# Patient Record
Sex: FEMALE | Race: WHITE | NOT HISPANIC OR LATINO
[De-identification: names, ages, dates, MRNs, and addresses within clinical notes are randomized per-mention and may not be internally consistent; named-entity substitution may affect disease eponyms.]

---

## 2017-01-09 ENCOUNTER — APPOINTMENT (OUTPATIENT)
Dept: ELECTROPHYSIOLOGY | Facility: CLINIC | Age: 82
End: 2017-01-09

## 2017-02-21 ENCOUNTER — MEDICATION RENEWAL (OUTPATIENT)
Age: 82
End: 2017-02-21

## 2017-04-10 ENCOUNTER — APPOINTMENT (OUTPATIENT)
Dept: ELECTROPHYSIOLOGY | Facility: CLINIC | Age: 82
End: 2017-04-10

## 2017-04-11 ENCOUNTER — EMERGENCY (EMERGENCY)
Facility: HOSPITAL | Age: 82
LOS: 1 days | Discharge: HOME | End: 2017-04-11
Attending: EMERGENCY MEDICINE
Payer: MEDICARE

## 2017-04-11 VITALS
HEIGHT: 66 IN | SYSTOLIC BLOOD PRESSURE: 174 MMHG | DIASTOLIC BLOOD PRESSURE: 77 MMHG | OXYGEN SATURATION: 98 % | WEIGHT: 184.97 LBS | RESPIRATION RATE: 18 BRPM | TEMPERATURE: 99 F | HEART RATE: 61 BPM

## 2017-04-11 DIAGNOSIS — Z79.01 LONG TERM (CURRENT) USE OF ANTICOAGULANTS: ICD-10-CM

## 2017-04-11 DIAGNOSIS — S13.4XXA SPRAIN OF LIGAMENTS OF CERVICAL SPINE, INITIAL ENCOUNTER: ICD-10-CM

## 2017-04-11 DIAGNOSIS — S49.91XA UNSPECIFIED INJURY OF RIGHT SHOULDER AND UPPER ARM, INITIAL ENCOUNTER: ICD-10-CM
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
## 2017-04-11 DIAGNOSIS — Z79.2 LONG TERM (CURRENT) USE OF ANTIBIOTICS: ICD-10-CM

## 2017-04-11 DIAGNOSIS — E89.0 POSTPROCEDURAL HYPOTHYROIDISM: Chronic | ICD-10-CM

## 2017-04-11 DIAGNOSIS — Z90.710 ACQUIRED ABSENCE OF BOTH CERVIX AND UTERUS: Chronic | ICD-10-CM

## 2017-04-11 DIAGNOSIS — E78.5 HYPERLIPIDEMIA, UNSPECIFIED: ICD-10-CM

## 2017-04-11 DIAGNOSIS — S80.01XA CONTUSION OF RIGHT KNEE, INITIAL ENCOUNTER: ICD-10-CM
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
## 2017-04-11 DIAGNOSIS — Z95.2 PRESENCE OF PROSTHETIC HEART VALVE: Chronic | ICD-10-CM

## 2017-04-11 DIAGNOSIS — Z88.8 ALLERGY STATUS TO OTHER DRUGS, MEDICAMENTS AND BIOLOGICAL SUBSTANCES STATUS: ICD-10-CM

## 2017-04-11 DIAGNOSIS — E11.9 TYPE 2 DIABETES MELLITUS WITHOUT COMPLICATIONS: ICD-10-CM

## 2017-04-11 DIAGNOSIS — M54.2 CERVICALGIA: ICD-10-CM

## 2017-04-11 DIAGNOSIS — I10 ESSENTIAL (PRIMARY) HYPERTENSION: ICD-10-CM

## 2017-04-11 LAB
ALBUMIN SERPL ELPH-MCNC: 3.8 G/DL — SIGNIFICANT CHANGE UP (ref 3.3–5.2)
ALP SERPL-CCNC: 94 U/L — SIGNIFICANT CHANGE UP (ref 40–120)
ALT FLD-CCNC: 15 U/L — SIGNIFICANT CHANGE UP
ANION GAP SERPL CALC-SCNC: 15 MMOL/L — SIGNIFICANT CHANGE UP (ref 5–17)
APTT BLD: 41.1 SEC — HIGH (ref 27.5–37.4)
AST SERPL-CCNC: 32 U/L — HIGH
BASOPHILS # BLD AUTO: 0 K/UL — SIGNIFICANT CHANGE UP (ref 0–0.2)
BASOPHILS NFR BLD AUTO: 0.3 % — SIGNIFICANT CHANGE UP (ref 0–2)
BILIRUB SERPL-MCNC: 0.4 MG/DL — SIGNIFICANT CHANGE UP (ref 0.4–2)
BUN SERPL-MCNC: 29 MG/DL — HIGH (ref 8–20)
CALCIUM SERPL-MCNC: 9.2 MG/DL — SIGNIFICANT CHANGE UP (ref 8.6–10.2)
CHLORIDE SERPL-SCNC: 100 MMOL/L — SIGNIFICANT CHANGE UP (ref 98–107)
CO2 SERPL-SCNC: 27 MMOL/L — SIGNIFICANT CHANGE UP (ref 22–29)
CREAT SERPL-MCNC: 1.02 MG/DL — SIGNIFICANT CHANGE UP (ref 0.5–1.3)
EOSINOPHIL # BLD AUTO: 0.1 K/UL — SIGNIFICANT CHANGE UP (ref 0–0.5)
EOSINOPHIL NFR BLD AUTO: 1.9 % — SIGNIFICANT CHANGE UP (ref 0–6)
GLUCOSE SERPL-MCNC: 98 MG/DL — SIGNIFICANT CHANGE UP (ref 70–115)
HCT VFR BLD CALC: 34.7 % — LOW (ref 37–47)
HGB BLD-MCNC: 11.5 G/DL — LOW (ref 12–16)
INR BLD: 2.57 RATIO — HIGH (ref 0.88–1.16)
LYMPHOCYTES # BLD AUTO: 1 K/UL — SIGNIFICANT CHANGE UP (ref 1–4.8)
LYMPHOCYTES # BLD AUTO: 14.6 % — LOW (ref 20–55)
MCHC RBC-ENTMCNC: 29.4 PG — SIGNIFICANT CHANGE UP (ref 27–31)
MCHC RBC-ENTMCNC: 33.1 G/DL — SIGNIFICANT CHANGE UP (ref 32–36)
MCV RBC AUTO: 88.7 FL — SIGNIFICANT CHANGE UP (ref 81–99)
MONOCYTES # BLD AUTO: 0.7 K/UL — SIGNIFICANT CHANGE UP (ref 0–0.8)
MONOCYTES NFR BLD AUTO: 9.7 % — SIGNIFICANT CHANGE UP (ref 3–10)
NEUTROPHILS # BLD AUTO: 5.1 K/UL — SIGNIFICANT CHANGE UP (ref 1.8–8)
NEUTROPHILS NFR BLD AUTO: 73.4 % — HIGH (ref 37–73)
PLAT MORPH BLD: NORMAL — SIGNIFICANT CHANGE UP
PLATELET # BLD AUTO: 153 K/UL — SIGNIFICANT CHANGE UP (ref 150–400)
POTASSIUM SERPL-MCNC: 4.1 MMOL/L — SIGNIFICANT CHANGE UP (ref 3.5–5.3)
POTASSIUM SERPL-SCNC: 4.1 MMOL/L — SIGNIFICANT CHANGE UP (ref 3.5–5.3)
PROT SERPL-MCNC: 7.5 G/DL — SIGNIFICANT CHANGE UP (ref 6.6–8.7)
PROTHROM AB SERPL-ACNC: 28.8 SEC — HIGH (ref 9.8–12.7)
RBC # BLD: 3.91 M/UL — LOW (ref 4.4–5.2)
RBC # FLD: 14.3 % — SIGNIFICANT CHANGE UP (ref 11–15.6)
RBC BLD AUTO: NORMAL — SIGNIFICANT CHANGE UP
SODIUM SERPL-SCNC: 142 MMOL/L — SIGNIFICANT CHANGE UP (ref 135–145)
WBC # BLD: 7 K/UL — SIGNIFICANT CHANGE UP (ref 4.8–10.8)
WBC # FLD AUTO: 7 K/UL — SIGNIFICANT CHANGE UP (ref 4.8–10.8)

## 2017-04-11 PROCEDURE — 99284 EMERGENCY DEPT VISIT MOD MDM: CPT

## 2017-04-11 PROCEDURE — 85730 THROMBOPLASTIN TIME PARTIAL: CPT

## 2017-04-11 PROCEDURE — 70450 CT HEAD/BRAIN W/O DYE: CPT | Mod: 26

## 2017-04-11 PROCEDURE — 99284 EMERGENCY DEPT VISIT MOD MDM: CPT | Mod: 25

## 2017-04-11 PROCEDURE — 85027 COMPLETE CBC AUTOMATED: CPT

## 2017-04-11 PROCEDURE — 85610 PROTHROMBIN TIME: CPT

## 2017-04-11 PROCEDURE — 73562 X-RAY EXAM OF KNEE 3: CPT | Mod: 26,RT

## 2017-04-11 PROCEDURE — 80053 COMPREHEN METABOLIC PANEL: CPT

## 2017-04-11 PROCEDURE — 73030 X-RAY EXAM OF SHOULDER: CPT

## 2017-04-11 PROCEDURE — 72125 CT NECK SPINE W/O DYE: CPT | Mod: 26

## 2017-04-11 PROCEDURE — 72125 CT NECK SPINE W/O DYE: CPT

## 2017-04-11 PROCEDURE — 72192 CT PELVIS W/O DYE: CPT | Mod: 26

## 2017-04-11 PROCEDURE — 73502 X-RAY EXAM HIP UNI 2-3 VIEWS: CPT

## 2017-04-11 PROCEDURE — 70450 CT HEAD/BRAIN W/O DYE: CPT

## 2017-04-11 PROCEDURE — 72192 CT PELVIS W/O DYE: CPT

## 2017-04-11 PROCEDURE — 76377 3D RENDER W/INTRP POSTPROCES: CPT

## 2017-04-11 PROCEDURE — 76377 3D RENDER W/INTRP POSTPROCES: CPT | Mod: 26

## 2017-04-11 PROCEDURE — 73562 X-RAY EXAM OF KNEE 3: CPT

## 2017-04-11 PROCEDURE — 73030 X-RAY EXAM OF SHOULDER: CPT | Mod: 26,RT

## 2017-04-11 PROCEDURE — 73502 X-RAY EXAM HIP UNI 2-3 VIEWS: CPT | Mod: 26,RT

## 2017-04-11 NOTE — ED ADULT TRIAGE NOTE - CHIEF COMPLAINT QUOTE
BIBA, s/p trip over rug at diner and fall. On coumadin for mechanical aortic valve, and AICD. Cardiologist Josue. Patient reports right knee, right hip and neck pain. no shortening or external rotation noted. EMS placed patient in cervical collar.

## 2017-04-11 NOTE — ED PROVIDER NOTE - OBJECTIVE STATEMENT
83 yo female  pmh diabetes,  aortic valve replacement on coumadin with trip and fall over a rug outside of diner; pt unsure if she hit her head, but complains of neck , rt shoulder, rt hip and rt knee pain

## 2017-04-11 NOTE — ED ADULT NURSE NOTE - PMH
Aortic valve, bicuspid    Cardiomyopathy    Diabetes    Hyperlipidemia    Hypertension    ICD (implantable cardioverter-defibrillator) in place    Shortness of breath    Thyroid cancer    VT (ventricular tachycardia) Aortic valve, bicuspid    Cardiomyopathy    Diabetes  Diet controlled  Hyperlipidemia    Hypertension    ICD (implantable cardioverter-defibrillator) in place    Shortness of breath    Thyroid cancer    VT (ventricular tachycardia)

## 2017-04-11 NOTE — ED PROVIDER NOTE - PMH
Aortic valve, bicuspid    Cardiomyopathy    Diabetes    Hyperlipidemia    Hypertension    ICD (implantable cardioverter-defibrillator) in place    Shortness of breath    Thyroid cancer    VT (ventricular tachycardia)

## 2017-04-11 NOTE — ED PROVIDER NOTE - CARE PLAN
Principal Discharge DX:	Cervical sprain, initial encounter  Secondary Diagnosis:	Shoulder injury, right, initial encounter  Secondary Diagnosis:	Contusion of right knee, initial encounter

## 2017-06-02 ENCOUNTER — NON-APPOINTMENT (OUTPATIENT)
Age: 82
End: 2017-06-02

## 2017-06-02 ENCOUNTER — APPOINTMENT (OUTPATIENT)
Dept: CARDIOLOGY | Facility: CLINIC | Age: 82
End: 2017-06-02

## 2017-06-02 ENCOUNTER — APPOINTMENT (OUTPATIENT)
Dept: ELECTROPHYSIOLOGY | Facility: CLINIC | Age: 82
End: 2017-06-02

## 2017-06-02 VITALS
WEIGHT: 197 LBS | HEART RATE: 60 BPM | OXYGEN SATURATION: 98 % | HEIGHT: 66 IN | BODY MASS INDEX: 31.66 KG/M2 | SYSTOLIC BLOOD PRESSURE: 156 MMHG | DIASTOLIC BLOOD PRESSURE: 58 MMHG

## 2017-06-02 DIAGNOSIS — I47.2 VENTRICULAR TACHYCARDIA: ICD-10-CM

## 2017-07-27 ENCOUNTER — MEDICATION RENEWAL (OUTPATIENT)
Age: 82
End: 2017-07-27

## 2017-07-28 ENCOUNTER — RX RENEWAL (OUTPATIENT)
Age: 82
End: 2017-07-28

## 2018-01-23 ENCOUNTER — APPOINTMENT (OUTPATIENT)
Dept: UROGYNECOLOGY | Facility: CLINIC | Age: 83
End: 2018-01-23

## 2018-01-26 ENCOUNTER — APPOINTMENT (OUTPATIENT)
Dept: CARDIOLOGY | Facility: CLINIC | Age: 83
End: 2018-01-26
Payer: MEDICARE

## 2018-01-26 ENCOUNTER — APPOINTMENT (OUTPATIENT)
Dept: ELECTROPHYSIOLOGY | Facility: CLINIC | Age: 83
End: 2018-01-26
Payer: MEDICARE

## 2018-01-26 ENCOUNTER — NON-APPOINTMENT (OUTPATIENT)
Age: 83
End: 2018-01-26

## 2018-01-26 VITALS
BODY MASS INDEX: 31.47 KG/M2 | HEART RATE: 60 BPM | SYSTOLIC BLOOD PRESSURE: 145 MMHG | DIASTOLIC BLOOD PRESSURE: 70 MMHG | WEIGHT: 195 LBS | OXYGEN SATURATION: 99 %

## 2018-01-26 VITALS — DIASTOLIC BLOOD PRESSURE: 70 MMHG | HEART RATE: 60 BPM | SYSTOLIC BLOOD PRESSURE: 146 MMHG | OXYGEN SATURATION: 99 %

## 2018-01-26 DIAGNOSIS — R21 RASH AND OTHER NONSPECIFIC SKIN ERUPTION: ICD-10-CM

## 2018-01-26 PROCEDURE — 93000 ELECTROCARDIOGRAM COMPLETE: CPT | Mod: 59

## 2018-01-26 PROCEDURE — 93325 DOPPLER ECHO COLOR FLOW MAPG: CPT

## 2018-01-26 PROCEDURE — 93284 PRGRMG EVAL IMPLANTABLE DFB: CPT

## 2018-01-26 PROCEDURE — 93308 TTE F-UP OR LMTD: CPT

## 2018-01-26 PROCEDURE — 93321 DOPPLER ECHO F-UP/LMTD STD: CPT

## 2018-01-26 PROCEDURE — 99214 OFFICE O/P EST MOD 30 MIN: CPT

## 2018-04-26 ENCOUNTER — APPOINTMENT (OUTPATIENT)
Dept: UROGYNECOLOGY | Facility: CLINIC | Age: 83
End: 2018-04-26
Payer: MEDICARE

## 2018-04-26 VITALS
SYSTOLIC BLOOD PRESSURE: 120 MMHG | HEIGHT: 66 IN | WEIGHT: 195 LBS | DIASTOLIC BLOOD PRESSURE: 70 MMHG | BODY MASS INDEX: 31.34 KG/M2

## 2018-04-26 DIAGNOSIS — R35.1 NOCTURIA: ICD-10-CM

## 2018-04-26 DIAGNOSIS — Z87.828 PERSONAL HISTORY OF OTHER (HEALED) PHYSICAL INJURY AND TRAUMA: ICD-10-CM

## 2018-04-26 DIAGNOSIS — Z86.79 PERSONAL HISTORY OF OTHER DISEASES OF THE CIRCULATORY SYSTEM: ICD-10-CM

## 2018-04-26 DIAGNOSIS — I51.9 HEART DISEASE, UNSPECIFIED: ICD-10-CM

## 2018-04-26 DIAGNOSIS — Z87.09 PERSONAL HISTORY OF OTHER DISEASES OF THE RESPIRATORY SYSTEM: ICD-10-CM

## 2018-04-26 DIAGNOSIS — Z78.9 OTHER SPECIFIED HEALTH STATUS: ICD-10-CM

## 2018-04-26 DIAGNOSIS — Z86.73 PERSONAL HISTORY OF TRANSIENT ISCHEMIC ATTACK (TIA), AND CEREBRAL INFARCTION W/OUT RESIDUAL DEFICITS: ICD-10-CM

## 2018-04-26 DIAGNOSIS — M53.9 DORSOPATHY, UNSPECIFIED: ICD-10-CM

## 2018-04-26 DIAGNOSIS — Z87.19 PERSONAL HISTORY OF OTHER DISEASES OF THE DIGESTIVE SYSTEM: ICD-10-CM

## 2018-04-26 DIAGNOSIS — Z86.018 PERSONAL HISTORY OF OTHER BENIGN NEOPLASM: ICD-10-CM

## 2018-04-26 DIAGNOSIS — Z87.01 PERSONAL HISTORY OF PNEUMONIA (RECURRENT): ICD-10-CM

## 2018-04-26 LAB
BILIRUB UR QL STRIP: NEGATIVE
CLARITY UR: CLEAR
COLLECTION METHOD: NORMAL
GLUCOSE UR-MCNC: NEGATIVE
HCG UR QL: 0.2 EU/DL
HGB UR QL STRIP.AUTO: NEGATIVE
KETONES UR-MCNC: NEGATIVE
LEUKOCYTE ESTERASE UR QL STRIP: NORMAL
NITRITE UR QL STRIP: NORMAL
PH UR STRIP: 6.5
PROT UR STRIP-MCNC: NEGATIVE
SP GR UR STRIP: 1.01

## 2018-04-26 PROCEDURE — 81003 URINALYSIS AUTO W/O SCOPE: CPT | Mod: QW

## 2018-04-26 PROCEDURE — 51701 INSERT BLADDER CATHETER: CPT

## 2018-04-26 PROCEDURE — 99204 OFFICE O/P NEW MOD 45 MIN: CPT | Mod: 25

## 2018-04-27 ENCOUNTER — RESULT REVIEW (OUTPATIENT)
Age: 83
End: 2018-04-27

## 2018-04-27 LAB
APPEARANCE: CLEAR
BACTERIA: NEGATIVE
BILIRUBIN URINE: NEGATIVE
BLOOD URINE: NEGATIVE
COLOR: YELLOW
GLUCOSE QUALITATIVE U: NEGATIVE MG/DL
HYALINE CASTS: 0 /LPF
KETONES URINE: NEGATIVE
LEUKOCYTE ESTERASE URINE: NEGATIVE
MICROSCOPIC-UA: NORMAL
NITRITE URINE: NEGATIVE
PH URINE: 6.5
PROTEIN URINE: NEGATIVE MG/DL
RED BLOOD CELLS URINE: 0 /HPF
SPECIFIC GRAVITY URINE: 1.01
SQUAMOUS EPITHELIAL CELLS: 0 /HPF
UROBILINOGEN URINE: NEGATIVE MG/DL
WHITE BLOOD CELLS URINE: 2 /HPF

## 2018-04-30 ENCOUNTER — RESULT REVIEW (OUTPATIENT)
Age: 83
End: 2018-04-30

## 2018-05-07 ENCOUNTER — APPOINTMENT (OUTPATIENT)
Dept: UROGYNECOLOGY | Facility: CLINIC | Age: 83
End: 2018-05-07
Payer: MEDICARE

## 2018-05-07 VITALS — SYSTOLIC BLOOD PRESSURE: 144 MMHG | DIASTOLIC BLOOD PRESSURE: 87 MMHG

## 2018-05-07 PROCEDURE — 51741 ELECTRO-UROFLOWMETRY FIRST: CPT

## 2018-05-07 PROCEDURE — 51729 CYSTOMETROGRAM W/VP&UP: CPT

## 2018-05-07 PROCEDURE — 51784 ANAL/URINARY MUSCLE STUDY: CPT

## 2018-05-07 PROCEDURE — 51797 INTRAABDOMINAL PRESSURE TEST: CPT

## 2018-06-04 ENCOUNTER — RX RENEWAL (OUTPATIENT)
Age: 83
End: 2018-06-04

## 2018-07-06 ENCOUNTER — RX RENEWAL (OUTPATIENT)
Age: 83
End: 2018-07-06

## 2018-07-26 PROBLEM — Z86.73 HISTORY OF STROKE: Status: RESOLVED | Noted: 2018-04-26 | Resolved: 2018-07-26

## 2018-08-16 ENCOUNTER — APPOINTMENT (OUTPATIENT)
Dept: UROGYNECOLOGY | Facility: CLINIC | Age: 83
End: 2018-08-16
Payer: MEDICARE

## 2018-08-16 VITALS — SYSTOLIC BLOOD PRESSURE: 127 MMHG | DIASTOLIC BLOOD PRESSURE: 67 MMHG

## 2018-08-16 DIAGNOSIS — N81.6 RECTOCELE: ICD-10-CM

## 2018-08-16 PROCEDURE — 99214 OFFICE O/P EST MOD 30 MIN: CPT | Mod: 25

## 2018-08-16 PROCEDURE — 51701 INSERT BLADDER CATHETER: CPT

## 2018-08-24 ENCOUNTER — RESULT REVIEW (OUTPATIENT)
Age: 83
End: 2018-08-24

## 2018-08-27 ENCOUNTER — OTHER (OUTPATIENT)
Age: 83
End: 2018-08-27

## 2018-08-27 RX ORDER — SIMVASTATIN 80 MG/1
TABLET, FILM COATED ORAL
Refills: 0 | Status: DISCONTINUED | COMMUNITY
End: 2018-08-27

## 2018-09-21 ENCOUNTER — NON-APPOINTMENT (OUTPATIENT)
Age: 83
End: 2018-09-21

## 2018-09-21 ENCOUNTER — APPOINTMENT (OUTPATIENT)
Dept: ELECTROPHYSIOLOGY | Facility: CLINIC | Age: 83
End: 2018-09-21
Payer: MEDICARE

## 2018-09-21 ENCOUNTER — APPOINTMENT (OUTPATIENT)
Dept: CARDIOLOGY | Facility: CLINIC | Age: 83
End: 2018-09-21
Payer: MEDICARE

## 2018-09-21 VITALS
DIASTOLIC BLOOD PRESSURE: 72 MMHG | BODY MASS INDEX: 29.92 KG/M2 | RESPIRATION RATE: 20 BRPM | OXYGEN SATURATION: 97 % | WEIGHT: 185.38 LBS | SYSTOLIC BLOOD PRESSURE: 124 MMHG | HEART RATE: 58 BPM

## 2018-09-21 DIAGNOSIS — E03.9 HYPOTHYROIDISM, UNSPECIFIED: ICD-10-CM

## 2018-09-21 DIAGNOSIS — E78.5 HYPERLIPIDEMIA, UNSPECIFIED: ICD-10-CM

## 2018-09-21 DIAGNOSIS — M10.9 GOUT, UNSPECIFIED: ICD-10-CM

## 2018-09-21 PROCEDURE — 93000 ELECTROCARDIOGRAM COMPLETE: CPT | Mod: 59

## 2018-09-21 PROCEDURE — 99214 OFFICE O/P EST MOD 30 MIN: CPT

## 2018-09-21 PROCEDURE — 93284 PRGRMG EVAL IMPLANTABLE DFB: CPT

## 2018-09-21 RX ORDER — MV-MIN/FOLIC/VIT K/LYCOP/COQ10 200-100MCG
CAPSULE ORAL
Refills: 0 | Status: COMPLETED | COMMUNITY
End: 2018-09-21

## 2018-09-21 RX ORDER — CLOTRIMAZOLE AND BETAMETHASONE DIPROPIONATE 10; .5 MG/G; MG/G
CREAM TOPICAL
Refills: 0 | Status: DISCONTINUED | COMMUNITY
End: 2018-09-21

## 2018-09-21 RX ORDER — CELECOXIB 200 MG/1
200 CAPSULE ORAL
Refills: 0 | Status: DISCONTINUED | COMMUNITY
End: 2018-09-21

## 2018-09-21 RX ORDER — CALCIUM/MAG/VITAMIN D2/ZN/MIN
SUSPENSION, ORAL (FINAL DOSE FORM) ORAL
Refills: 0 | Status: COMPLETED | COMMUNITY
End: 2018-09-21

## 2018-09-21 RX ORDER — ENOXAPARIN SODIUM 100 MG/ML
100 INJECTION SUBCUTANEOUS
Qty: 10 | Refills: 0 | Status: DISCONTINUED | COMMUNITY
Start: 2017-06-02 | End: 2018-09-21

## 2018-09-21 RX ORDER — METRONIDAZOLE 7.5 MG/G
CREAM TOPICAL
Refills: 0 | Status: DISCONTINUED | COMMUNITY
End: 2018-09-21

## 2018-09-28 ENCOUNTER — APPOINTMENT (OUTPATIENT)
Dept: UROGYNECOLOGY | Facility: CLINIC | Age: 83
End: 2018-09-28
Payer: MEDICARE

## 2018-09-28 VITALS — DIASTOLIC BLOOD PRESSURE: 70 MMHG | SYSTOLIC BLOOD PRESSURE: 112 MMHG

## 2018-09-28 DIAGNOSIS — Z87.440 PERSONAL HISTORY OF URINARY (TRACT) INFECTIONS: ICD-10-CM

## 2018-09-28 PROCEDURE — 51701 INSERT BLADDER CATHETER: CPT

## 2018-09-28 PROCEDURE — 99213 OFFICE O/P EST LOW 20 MIN: CPT | Mod: 25

## 2018-10-01 ENCOUNTER — RESULT REVIEW (OUTPATIENT)
Age: 83
End: 2018-10-01

## 2018-10-01 LAB
APPEARANCE: ABNORMAL
BACTERIA UR CULT: ABNORMAL
BACTERIA: ABNORMAL
BILIRUBIN URINE: NEGATIVE
BLOOD URINE: ABNORMAL
COLOR: YELLOW
GLUCOSE QUALITATIVE U: NEGATIVE MG/DL
HYALINE CASTS: 2 /LPF
KETONES URINE: NEGATIVE
LEUKOCYTE ESTERASE URINE: ABNORMAL
MICROSCOPIC-UA: NORMAL
NITRITE URINE: POSITIVE
PH URINE: 6.5
PROTEIN URINE: NEGATIVE MG/DL
RED BLOOD CELLS URINE: 2 /HPF
SPECIFIC GRAVITY URINE: 1.01
SQUAMOUS EPITHELIAL CELLS: 0 /HPF
UROBILINOGEN URINE: NEGATIVE MG/DL
WHITE BLOOD CELLS URINE: 700 /HPF

## 2018-10-09 ENCOUNTER — RX RENEWAL (OUTPATIENT)
Age: 83
End: 2018-10-09

## 2018-10-12 ENCOUNTER — APPOINTMENT (OUTPATIENT)
Dept: UROGYNECOLOGY | Facility: CLINIC | Age: 83
End: 2018-10-12
Payer: MEDICARE

## 2018-10-12 VITALS — DIASTOLIC BLOOD PRESSURE: 73 MMHG | SYSTOLIC BLOOD PRESSURE: 134 MMHG

## 2018-10-12 PROCEDURE — 51701 INSERT BLADDER CATHETER: CPT

## 2018-10-15 ENCOUNTER — RESULT REVIEW (OUTPATIENT)
Age: 83
End: 2018-10-15

## 2018-10-15 LAB
APPEARANCE: CLEAR
BACTERIA UR CULT: NORMAL
BACTERIA: NEGATIVE
BILIRUBIN URINE: NEGATIVE
BLOOD URINE: NEGATIVE
COLOR: YELLOW
GLUCOSE QUALITATIVE U: NEGATIVE MG/DL
HYALINE CASTS: 4 /LPF
KETONES URINE: NEGATIVE
LEUKOCYTE ESTERASE URINE: NEGATIVE
MICROSCOPIC-UA: NORMAL
NITRITE URINE: NEGATIVE
PH URINE: 6.5
PROTEIN URINE: NEGATIVE MG/DL
RED BLOOD CELLS URINE: 1 /HPF
SPECIFIC GRAVITY URINE: 1.01
SQUAMOUS EPITHELIAL CELLS: 0 /HPF
UROBILINOGEN URINE: NEGATIVE MG/DL
WHITE BLOOD CELLS URINE: 0 /HPF

## 2018-10-22 ENCOUNTER — APPOINTMENT (OUTPATIENT)
Dept: UROGYNECOLOGY | Facility: CLINIC | Age: 83
End: 2018-10-22
Payer: MEDICARE

## 2018-10-22 VITALS
SYSTOLIC BLOOD PRESSURE: 97 MMHG | BODY MASS INDEX: 29.73 KG/M2 | WEIGHT: 185 LBS | DIASTOLIC BLOOD PRESSURE: 63 MMHG | HEIGHT: 66 IN

## 2018-10-22 DIAGNOSIS — R39.15 URGENCY OF URINATION: ICD-10-CM

## 2018-10-22 DIAGNOSIS — N39.0 URINARY TRACT INFECTION, SITE NOT SPECIFIED: ICD-10-CM

## 2018-10-22 DIAGNOSIS — R35.0 FREQUENCY OF MICTURITION: ICD-10-CM

## 2018-10-22 DIAGNOSIS — R32 UNSPECIFIED URINARY INCONTINENCE: ICD-10-CM

## 2018-10-22 DIAGNOSIS — R33.9 RETENTION OF URINE, UNSPECIFIED: ICD-10-CM

## 2018-10-22 PROCEDURE — 51701 INSERT BLADDER CATHETER: CPT

## 2018-10-22 PROCEDURE — 99213 OFFICE O/P EST LOW 20 MIN: CPT | Mod: 25

## 2018-10-23 ENCOUNTER — RESULT REVIEW (OUTPATIENT)
Age: 83
End: 2018-10-23

## 2018-10-25 ENCOUNTER — RESULT REVIEW (OUTPATIENT)
Age: 83
End: 2018-10-25

## 2018-10-26 ENCOUNTER — APPOINTMENT (OUTPATIENT)
Dept: UROGYNECOLOGY | Facility: CLINIC | Age: 83
End: 2018-10-26

## 2018-10-27 ENCOUNTER — MOBILE ON CALL (OUTPATIENT)
Age: 83
End: 2018-10-27

## 2018-12-13 ENCOUNTER — MEDICATION RENEWAL (OUTPATIENT)
Age: 83
End: 2018-12-13

## 2018-12-13 RX ORDER — NYSTATIN 100000 1/G
100000 POWDER TOPICAL
Qty: 1 | Refills: 3 | Status: DISCONTINUED | COMMUNITY
Start: 2018-01-26 | End: 2018-09-21

## 2018-12-17 ENCOUNTER — RX RENEWAL (OUTPATIENT)
Age: 83
End: 2018-12-17

## 2018-12-18 ENCOUNTER — RX RENEWAL (OUTPATIENT)
Age: 83
End: 2018-12-18

## 2018-12-18 DIAGNOSIS — R23.8 OTHER SKIN CHANGES: ICD-10-CM

## 2019-01-11 NOTE — ED ADULT NURSE NOTE - OBJECTIVE STATEMENT
Report given to OR Rn @  AM Assumed patient care at 1200.  Pt reports trip and fall landing on her right side.  She is c/o right knee pain, right hip, right shoulder pain, and neck pain. C-collar in place.  Moves all four extremeties with equal strength.  Swelling and ecchymosis present to right knee.  Pt state right leg more swollen than left leg X "years."  No obvious signs of deformity of right shoulder or right hip.  Alert and oriented X 3 denies LOC. Report given to OR Yulisa Cobb @7:50  AM

## 2019-01-15 ENCOUNTER — RX RENEWAL (OUTPATIENT)
Age: 84
End: 2019-01-15

## 2019-01-22 ENCOUNTER — APPOINTMENT (OUTPATIENT)
Dept: ELECTROPHYSIOLOGY | Facility: CLINIC | Age: 84
End: 2019-01-22
Payer: MEDICARE

## 2019-01-22 PROCEDURE — 93296 REM INTERROG EVL PM/IDS: CPT

## 2019-01-22 PROCEDURE — 93295 DEV INTERROG REMOTE 1/2/MLT: CPT

## 2019-02-14 ENCOUNTER — APPOINTMENT (OUTPATIENT)
Dept: UROGYNECOLOGY | Facility: CLINIC | Age: 84
End: 2019-02-14
Payer: MEDICARE

## 2019-02-14 VITALS — SYSTOLIC BLOOD PRESSURE: 133 MMHG | DIASTOLIC BLOOD PRESSURE: 78 MMHG

## 2019-02-14 PROCEDURE — 99213 OFFICE O/P EST LOW 20 MIN: CPT | Mod: 25

## 2019-02-14 PROCEDURE — 51701 INSERT BLADDER CATHETER: CPT

## 2019-02-14 RX ORDER — NYSTATIN 100000 1/G
100000 POWDER TOPICAL 3 TIMES DAILY
Qty: 1 | Refills: 5 | Status: ACTIVE | COMMUNITY
Start: 2019-02-14 | End: 1900-01-01

## 2019-02-14 NOTE — CHIEF COMPLAINT
[Poor Bladder Control] : poor bladder control [Cannot Empty Bladder] : cannot empty bladder [Urine Frequency] : urine frequency

## 2019-02-14 NOTE — PROCEDURE
[Straight Catheterization] : insertion of a straight catheter [Urinary Retention] : urinary retention [Patient] : the patient [___ Fr Straight Tip] : a [unfilled] in [place] straight tip catheter [None] : there were no complications with the catheter insertion [Clear] : clear [Culture] : culture [Urinalysis] : urinalysis [No Complications] : no complications [Tolerated Well] : the patient tolerated the procedure well [Post procedure instructions and information given] : Post procedure instructions and information were given and reviewed with patient. [0] : 0

## 2019-02-14 NOTE — HISTORY OF PRESENT ILLNESS
[FreeTextEntry1] : This 83 yo woman who presented with urine incontinence UUI . MARY, for years, nocturia 3-4 X per night, sensation of not emptying, need to assist BM digitally, We attempted to teach her to self catheterize, but she had very limited dexterity and a large pannus in the way and was unable to do it. \par She was started on Tamsulosin 0.4 mg 1 PO Daily 8/28/18 and increased to 0.4 mg BID 10/28/17 and added Hiprex OD 10/12/18.  She states that she is still incontinent but stays dry until she rises up from a chair or rolls over in bed.       \par \par

## 2019-02-14 NOTE — DISCUSSION/SUMMARY
[FreeTextEntry1] : Selina reports that she feels better taking the Tamsulosin 0.4 mg BID and the Hiprex OD, she is uncertain if she emptyies bladder but states she remains dry and only leaks with movement, standing, rolling over.  PVR today with catheter is 150 cc with terminal pus, urine sent to the lab for testing.  Renewed Tamsulosin and Hiprex and ordered Nystatin topical powder per patient's request.  RTC X 6 months or PRN.

## 2019-02-15 ENCOUNTER — RESULT REVIEW (OUTPATIENT)
Age: 84
End: 2019-02-15

## 2019-02-15 LAB
APPEARANCE: ABNORMAL
BACTERIA: ABNORMAL
BILIRUBIN URINE: NEGATIVE
BLOOD URINE: ABNORMAL
COLOR: YELLOW
GLUCOSE QUALITATIVE U: NEGATIVE MG/DL
HYALINE CASTS: 2 /LPF
KETONES URINE: NEGATIVE
LEUKOCYTE ESTERASE URINE: ABNORMAL
MICROSCOPIC-UA: NORMAL
NITRITE URINE: POSITIVE
PH URINE: 6
PROTEIN URINE: NEGATIVE MG/DL
RED BLOOD CELLS URINE: 1 /HPF
SPECIFIC GRAVITY URINE: 1.01
SQUAMOUS EPITHELIAL CELLS: 0 /HPF
UROBILINOGEN URINE: NEGATIVE MG/DL
WHITE BLOOD CELLS URINE: 178 /HPF

## 2019-02-18 LAB — BACTERIA UR CULT: NORMAL

## 2019-03-26 ENCOUNTER — RECORD ABSTRACTING (OUTPATIENT)
Age: 84
End: 2019-03-26

## 2019-03-26 ENCOUNTER — MED ADMIN CHARGE (OUTPATIENT)
Age: 84
End: 2019-03-26

## 2019-04-02 ENCOUNTER — NON-APPOINTMENT (OUTPATIENT)
Age: 84
End: 2019-04-02

## 2019-04-02 ENCOUNTER — APPOINTMENT (OUTPATIENT)
Dept: ELECTROPHYSIOLOGY | Facility: CLINIC | Age: 84
End: 2019-04-02
Payer: MEDICARE

## 2019-04-02 ENCOUNTER — APPOINTMENT (OUTPATIENT)
Dept: CARDIOLOGY | Facility: CLINIC | Age: 84
End: 2019-04-02
Payer: MEDICARE

## 2019-04-02 VITALS
OXYGEN SATURATION: 96 % | WEIGHT: 212 LBS | DIASTOLIC BLOOD PRESSURE: 56 MMHG | SYSTOLIC BLOOD PRESSURE: 140 MMHG | HEART RATE: 64 BPM | BODY MASS INDEX: 34.22 KG/M2

## 2019-04-02 DIAGNOSIS — I10 ESSENTIAL (PRIMARY) HYPERTENSION: ICD-10-CM

## 2019-04-02 DIAGNOSIS — Z95.810 PRESENCE OF AUTOMATIC (IMPLANTABLE) CARDIAC DEFIBRILLATOR: ICD-10-CM

## 2019-04-02 PROCEDURE — 93000 ELECTROCARDIOGRAM COMPLETE: CPT | Mod: 59

## 2019-04-02 PROCEDURE — 99214 OFFICE O/P EST MOD 30 MIN: CPT | Mod: 25

## 2019-04-02 PROCEDURE — 93284 PRGRMG EVAL IMPLANTABLE DFB: CPT

## 2019-04-02 RX ORDER — SIMVASTATIN 40 MG/1
40 TABLET, FILM COATED ORAL
Qty: 90 | Refills: 1 | Status: ACTIVE | COMMUNITY
Start: 2019-04-02

## 2019-04-02 RX ORDER — LUBIPROSTONE 8 UG/1
8 CAPSULE, GELATIN COATED ORAL
Refills: 0 | Status: DISCONTINUED | COMMUNITY
End: 2019-04-02

## 2019-04-02 RX ORDER — NYSTATIN 100000 1/G
100000 POWDER TOPICAL
Refills: 0 | Status: DISCONTINUED | COMMUNITY
Start: 2018-12-18 | End: 2019-04-02

## 2019-04-02 RX ORDER — NYSTATIN 100000 [USP'U]/G
100000 POWDER TOPICAL
Qty: 60 | Refills: 3 | Status: DISCONTINUED | COMMUNITY
Start: 2018-12-18 | End: 2019-04-02

## 2019-04-02 RX ORDER — TAMSULOSIN HYDROCHLORIDE 0.4 MG/1
0.4 CAPSULE ORAL
Qty: 30 | Refills: 1 | Status: DISCONTINUED | COMMUNITY
Start: 2018-08-28 | End: 2019-04-02

## 2019-04-02 RX ORDER — EZETIMIBE 10 MG/1
10 TABLET ORAL
Qty: 90 | Refills: 0 | Status: DISCONTINUED | COMMUNITY
Start: 2018-07-24 | End: 2019-04-02

## 2019-04-02 RX ORDER — HYDROCHLOROTHIAZIDE 12.5 MG/1
12.5 CAPSULE ORAL
Qty: 90 | Refills: 0 | Status: DISCONTINUED | COMMUNITY
Start: 2018-07-24 | End: 2019-04-02

## 2019-04-02 RX ORDER — FUROSEMIDE 40 MG/1
40 TABLET ORAL
Qty: 60 | Refills: 0 | Status: DISCONTINUED | COMMUNITY
Start: 2017-06-02 | End: 2019-04-02

## 2019-04-02 RX ORDER — ALLOPURINOL 300 MG/1
300 TABLET ORAL DAILY
Refills: 0 | Status: ACTIVE | COMMUNITY
Start: 2017-08-21

## 2019-04-02 RX ORDER — CEFUROXIME AXETIL 500 MG/1
500 TABLET ORAL
Qty: 14 | Refills: 0 | Status: DISCONTINUED | COMMUNITY
Start: 2018-10-25 | End: 2019-04-02

## 2019-04-02 RX ORDER — LEVOTHYROXINE SODIUM 0.1 MG/1
100 TABLET ORAL
Qty: 30 | Refills: 0 | Status: DISCONTINUED | COMMUNITY
Start: 2017-08-07 | End: 2019-04-02

## 2019-04-02 RX ORDER — LORATADINE 10 MG
17 TABLET,DISINTEGRATING ORAL
Refills: 0 | Status: DISCONTINUED | COMMUNITY
End: 2019-04-02

## 2019-04-02 RX ORDER — NEOMYCIN AND POLYMYXIN B SULFATES, BACITRACIN ZINC, AND HYDROCORTISONE 3.5; 5000; 400; 1 MG/G; [IU]/G; [IU]/G; MG/G
1 OINTMENT TOPICAL
Qty: 15 | Refills: 0 | Status: DISCONTINUED | COMMUNITY
Start: 2018-04-20 | End: 2019-04-02

## 2019-04-02 RX ORDER — CIPROFLOXACIN HYDROCHLORIDE 500 MG/1
500 TABLET, FILM COATED ORAL
Refills: 0 | Status: ACTIVE | COMMUNITY
Start: 2019-03-29

## 2019-04-02 RX ORDER — WARFARIN SODIUM 3 MG/1
3 TABLET ORAL
Refills: 0 | Status: ACTIVE | COMMUNITY
Start: 2018-04-04

## 2019-04-03 RX ORDER — HYDROCHLOROTHIAZIDE 25 MG/1
25 TABLET ORAL
Refills: 1 | Status: DISCONTINUED | COMMUNITY
End: 2019-04-03

## 2019-04-12 ENCOUNTER — APPOINTMENT (OUTPATIENT)
Dept: CARDIOLOGY | Facility: CLINIC | Age: 84
End: 2019-04-12
Payer: MEDICARE

## 2019-04-12 ENCOUNTER — RESULT REVIEW (OUTPATIENT)
Age: 84
End: 2019-04-12

## 2019-04-12 LAB
ANION GAP SERPL CALC-SCNC: 15 MMOL/L
BUN SERPL-MCNC: 35 MG/DL
CALCIUM SERPL-MCNC: 9.1 MG/DL
CHLORIDE SERPL-SCNC: 106 MMOL/L
CO2 SERPL-SCNC: 25 MMOL/L
CREAT SERPL-MCNC: 1.29 MG/DL
GLUCOSE SERPL-MCNC: 120 MG/DL
NT-PROBNP SERPL-MCNC: 388 PG/ML
POTASSIUM SERPL-SCNC: 4.3 MMOL/L
SODIUM SERPL-SCNC: 146 MMOL/L

## 2019-04-12 PROCEDURE — 93925 LOWER EXTREMITY STUDY: CPT

## 2019-04-12 PROCEDURE — 93970 EXTREMITY STUDY: CPT

## 2019-04-12 PROCEDURE — 93306 TTE W/DOPPLER COMPLETE: CPT

## 2019-04-22 ENCOUNTER — APPOINTMENT (OUTPATIENT)
Dept: ELECTROPHYSIOLOGY | Facility: CLINIC | Age: 84
End: 2019-04-22
Payer: MEDICARE

## 2019-04-22 PROCEDURE — 93296 REM INTERROG EVL PM/IDS: CPT

## 2019-04-22 PROCEDURE — 93295 DEV INTERROG REMOTE 1/2/MLT: CPT

## 2019-05-10 ENCOUNTER — MESSAGE (OUTPATIENT)
Age: 84
End: 2019-05-10

## 2019-05-24 ENCOUNTER — APPOINTMENT (OUTPATIENT)
Dept: CARDIOLOGY | Facility: CLINIC | Age: 84
End: 2019-05-24
Payer: MEDICARE

## 2019-05-24 ENCOUNTER — NON-APPOINTMENT (OUTPATIENT)
Age: 84
End: 2019-05-24

## 2019-05-24 ENCOUNTER — APPOINTMENT (OUTPATIENT)
Dept: ELECTROPHYSIOLOGY | Facility: CLINIC | Age: 84
End: 2019-05-24
Payer: MEDICARE

## 2019-05-24 VITALS
DIASTOLIC BLOOD PRESSURE: 46 MMHG | HEIGHT: 66 IN | OXYGEN SATURATION: 96 % | WEIGHT: 201 LBS | SYSTOLIC BLOOD PRESSURE: 126 MMHG | BODY MASS INDEX: 32.3 KG/M2 | HEART RATE: 61 BPM

## 2019-05-24 DIAGNOSIS — I09.9 RHEUMATIC HEART DISEASE, UNSPECIFIED: ICD-10-CM

## 2019-05-24 DIAGNOSIS — R60.0 LOCALIZED EDEMA: ICD-10-CM

## 2019-05-24 DIAGNOSIS — I35.9 NONRHEUMATIC AORTIC VALVE DISORDER, UNSPECIFIED: ICD-10-CM

## 2019-05-24 DIAGNOSIS — I51.89 OTHER ILL-DEFINED HEART DISEASES: ICD-10-CM

## 2019-05-24 PROCEDURE — 93000 ELECTROCARDIOGRAM COMPLETE: CPT | Mod: 59

## 2019-05-24 PROCEDURE — 99215 OFFICE O/P EST HI 40 MIN: CPT

## 2019-05-24 PROCEDURE — 93284 PRGRMG EVAL IMPLANTABLE DFB: CPT

## 2019-05-24 RX ORDER — CEFUROXIME AXETIL 500 MG/1
500 TABLET ORAL
Qty: 14 | Refills: 0 | Status: DISCONTINUED | COMMUNITY
Start: 2018-10-01 | End: 2019-05-24

## 2019-06-14 ENCOUNTER — APPOINTMENT (OUTPATIENT)
Dept: UROGYNECOLOGY | Facility: CLINIC | Age: 84
End: 2019-06-14
Payer: MEDICARE

## 2019-06-14 VITALS — DIASTOLIC BLOOD PRESSURE: 75 MMHG | SYSTOLIC BLOOD PRESSURE: 138 MMHG

## 2019-06-14 PROCEDURE — 99213 OFFICE O/P EST LOW 20 MIN: CPT | Mod: 25

## 2019-06-14 PROCEDURE — 51701 INSERT BLADDER CATHETER: CPT

## 2019-06-14 RX ORDER — METHENAMINE HIPPURATE 1 G/1
1 TABLET ORAL DAILY
Qty: 30 | Refills: 5 | Status: ACTIVE | COMMUNITY
Start: 2018-10-12 | End: 1900-01-01

## 2019-06-14 RX ORDER — CEFUROXIME AXETIL 500 MG/1
500 TABLET ORAL
Qty: 14 | Refills: 0 | Status: ACTIVE | COMMUNITY
Start: 2018-08-24 | End: 1900-01-01

## 2019-06-14 NOTE — CHIEF COMPLAINT
[Cannot Empty Bladder] : cannot empty bladder [Poor Bladder Control] : poor bladder control [Urine Frequency] : urine frequency

## 2019-06-14 NOTE — DISCUSSION/SUMMARY
[FreeTextEntry1] : Urine obtained with a catheter for accuracy and to check elevated RVR, cloudy and malodorous.  Sent to the lab for testing.  Will renew Hiprex OD and Tamsulosin 0.4 mg 1 PO BID, also Rx Ceftin 500 mg 1 PO BID X 7 days.  Leaving soon for a long road trip with her , instructed to see Dr Newman upon her return.

## 2019-06-14 NOTE — PROCEDURE
[Straight Catheterization] : insertion of a straight catheter [Urinary Retention] : urinary retention [Urinary Tract Infection] : a urinary tract infection [Patient] : the patient [___ Fr Straight Tip] : a [unfilled] in [place] straight tip catheter [None] : there were no complications with the catheter insertion [Urinalysis] : urinalysis [Culture] : culture [Cloudy] : cloudy [Tolerated Well] : the patient tolerated the procedure well [No Complications] : no complications [Post procedure instructions and information given] : Post procedure instructions and information were given and reviewed with patient. [0] : 0

## 2019-06-14 NOTE — HISTORY OF PRESENT ILLNESS
[FreeTextEntry1] : This 84 yo woman who presented with urine incontinence UUI . MARY, for years, nocturia 3-4 X per night, sensation of not emptying, need to assist BM digitally, We attempted to teach her to self catheterize, but she had very limited dexterity and a large pannus in the way and was unable to do it. \par She was started on Tamsulosin 0.4 mg 1 PO Daily 8/28/18 and increased to 0.4 mg BID 10/28/17 and added Hiprex OD 10/12/18. She states that she is still incontinent but stays dry until she rises up from a chair, moves or rolls over in bed. \par

## 2019-06-17 ENCOUNTER — RESULT REVIEW (OUTPATIENT)
Age: 84
End: 2019-06-17

## 2019-06-17 LAB
APPEARANCE: CLEAR
BACTERIA UR CULT: ABNORMAL
BACTERIA: ABNORMAL
BILIRUBIN URINE: NEGATIVE
BLOOD URINE: NEGATIVE
COLOR: YELLOW
GLUCOSE QUALITATIVE U: NEGATIVE
HYALINE CASTS: 2 /LPF
KETONES URINE: NEGATIVE
LEUKOCYTE ESTERASE URINE: ABNORMAL
MICROSCOPIC-UA: NORMAL
NITRITE URINE: POSITIVE
PH URINE: 6
PROTEIN URINE: NEGATIVE
RED BLOOD CELLS URINE: 2 /HPF
SPECIFIC GRAVITY URINE: 1.01
SQUAMOUS EPITHELIAL CELLS: 0 /HPF
UROBILINOGEN URINE: NORMAL
WHITE BLOOD CELLS URINE: 88 /HPF

## 2019-06-23 NOTE — HISTORY OF PRESENT ILLNESS
[FreeTextEntry1] :  84 old woman with history of rheumatic heart disease and mechanical AVR and biV ICD upgrade doing well. ICD was initially placed after sudden death.   Echo in April 2019 with LVEF 67% with declining functioning mechanical AVR and mild mitral stenosis.  She had a biV upgrade on August 15, 2016 which was complicated by an allergic reaction to an unknown agent that resulted in hives.  She denies chest pain, worsening dyspnea or near syncope but her Optival has been increased and she has progressive LE edema right greater than left.  She also noted arthritis of her hand and hips.  We discussed the progressive mechanical valve dysfunction with possible panus formation but she is not interested in surgery at this time and if she needed surgery, it would need to be blood avoiding given her Orthodox beliefs.  I informed her and her  that a reoperation with a new valve would be risky and that we should observe her closely since she is essentially without significant syptoms at this time.  Device check today with no arrhythmia and Optival down to normal.\par \par

## 2019-06-23 NOTE — REVIEW OF SYSTEMS
[Lower Ext Edema] : lower extremity edema [see HPI] : see HPI [Negative] : Psychiatric [Chest Pain] : no chest pain [Palpitations] : no palpitations

## 2019-08-19 ENCOUNTER — RX RENEWAL (OUTPATIENT)
Age: 84
End: 2019-08-19

## 2019-08-21 RX ORDER — TAMSULOSIN HYDROCHLORIDE 0.4 MG/1
0.4 CAPSULE ORAL
Qty: 60 | Refills: 5 | Status: ACTIVE | COMMUNITY
Start: 2018-09-28 | End: 1900-01-01

## 2019-09-23 ENCOUNTER — APPOINTMENT (OUTPATIENT)
Dept: UROGYNECOLOGY | Facility: CLINIC | Age: 84
End: 2019-09-23

## 2019-10-08 ENCOUNTER — RX RENEWAL (OUTPATIENT)
Age: 84
End: 2019-10-08

## 2020-01-31 ENCOUNTER — APPOINTMENT (OUTPATIENT)
Dept: ELECTROPHYSIOLOGY | Facility: CLINIC | Age: 85
End: 2020-01-31

## 2020-01-31 ENCOUNTER — APPOINTMENT (OUTPATIENT)
Dept: CARDIOLOGY | Facility: CLINIC | Age: 85
End: 2020-01-31

## 2020-02-28 ENCOUNTER — APPOINTMENT (OUTPATIENT)
Dept: ELECTROPHYSIOLOGY | Facility: CLINIC | Age: 85
End: 2020-02-28

## 2020-02-28 ENCOUNTER — APPOINTMENT (OUTPATIENT)
Dept: CARDIOLOGY | Facility: CLINIC | Age: 85
End: 2020-02-28

## 2020-06-12 ENCOUNTER — APPOINTMENT (OUTPATIENT)
Dept: CARDIOLOGY | Facility: CLINIC | Age: 85
End: 2020-06-12

## 2020-06-12 ENCOUNTER — APPOINTMENT (OUTPATIENT)
Dept: ELECTROPHYSIOLOGY | Facility: CLINIC | Age: 85
End: 2020-06-12

## 2020-12-16 PROBLEM — Z87.440 HISTORY OF URINARY TRACT INFECTION: Status: RESOLVED | Noted: 2018-09-28 | Resolved: 2020-12-16

## 2021-05-01 ENCOUNTER — INPATIENT (INPATIENT)
Facility: HOSPITAL | Age: 86
LOS: 4 days | Discharge: HOME | DRG: 551 | End: 2021-05-06
Attending: SURGERY | Admitting: SURGERY
Payer: MEDICARE

## 2021-05-01 VITALS
SYSTOLIC BLOOD PRESSURE: 145 MMHG | TEMPERATURE: 98 F | DIASTOLIC BLOOD PRESSURE: 79 MMHG | HEART RATE: 65 BPM | RESPIRATION RATE: 16 BRPM | OXYGEN SATURATION: 98 % | HEIGHT: 66 IN

## 2021-05-01 DIAGNOSIS — Z90.710 ACQUIRED ABSENCE OF BOTH CERVIX AND UTERUS: Chronic | ICD-10-CM

## 2021-05-01 DIAGNOSIS — R79.1 ABNORMAL COAGULATION PROFILE: ICD-10-CM

## 2021-05-01 DIAGNOSIS — S13.120A SUBLUXATION OF C1/C2 CERVICAL VERTEBRAE, INITIAL ENCOUNTER: ICD-10-CM

## 2021-05-01 DIAGNOSIS — E89.0 POSTPROCEDURAL HYPOTHYROIDISM: Chronic | ICD-10-CM

## 2021-05-01 DIAGNOSIS — Z95.2 PRESENCE OF PROSTHETIC HEART VALVE: Chronic | ICD-10-CM
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
## 2021-05-01 LAB
ALBUMIN SERPL ELPH-MCNC: 3.6 G/DL — SIGNIFICANT CHANGE UP (ref 3.3–5.2)
ALP SERPL-CCNC: 75 U/L — SIGNIFICANT CHANGE UP (ref 40–120)
ALT FLD-CCNC: 101 U/L — HIGH
ANION GAP SERPL CALC-SCNC: 12 MMOL/L — SIGNIFICANT CHANGE UP (ref 5–17)
APPEARANCE UR: ABNORMAL
APTT BLD: 40.1 SEC — HIGH (ref 27.5–35.5)
AST SERPL-CCNC: 318 U/L — HIGH
BACTERIA # UR AUTO: ABNORMAL
BASOPHILS # BLD AUTO: 0.02 K/UL — SIGNIFICANT CHANGE UP (ref 0–0.2)
BASOPHILS NFR BLD AUTO: 0.2 % — SIGNIFICANT CHANGE UP (ref 0–2)
BILIRUB SERPL-MCNC: 0.4 MG/DL — SIGNIFICANT CHANGE UP (ref 0.4–2)
BILIRUB UR-MCNC: NEGATIVE — SIGNIFICANT CHANGE UP
BLD GP AB SCN SERPL QL: SIGNIFICANT CHANGE UP
BUN SERPL-MCNC: 36 MG/DL — HIGH (ref 8–20)
CALCIUM SERPL-MCNC: 9.3 MG/DL — SIGNIFICANT CHANGE UP (ref 8.6–10.2)
CHLORIDE SERPL-SCNC: 100 MMOL/L — SIGNIFICANT CHANGE UP (ref 98–107)
CO2 SERPL-SCNC: 28 MMOL/L — SIGNIFICANT CHANGE UP (ref 22–29)
COLOR SPEC: YELLOW — SIGNIFICANT CHANGE UP
CREAT SERPL-MCNC: 0.93 MG/DL — SIGNIFICANT CHANGE UP (ref 0.5–1.3)
DIFF PNL FLD: ABNORMAL
EOSINOPHIL # BLD AUTO: 0.11 K/UL — SIGNIFICANT CHANGE UP (ref 0–0.5)
EOSINOPHIL NFR BLD AUTO: 1.3 % — SIGNIFICANT CHANGE UP (ref 0–6)
EPI CELLS # UR: SIGNIFICANT CHANGE UP
GLUCOSE BLDC GLUCOMTR-MCNC: 99 MG/DL — SIGNIFICANT CHANGE UP (ref 70–99)
GLUCOSE SERPL-MCNC: 89 MG/DL — SIGNIFICANT CHANGE UP (ref 70–99)
GLUCOSE UR QL: NEGATIVE — SIGNIFICANT CHANGE UP
HCT VFR BLD CALC: 35 % — SIGNIFICANT CHANGE UP (ref 34.5–45)
HGB BLD-MCNC: 11.3 G/DL — LOW (ref 11.5–15.5)
IMM GRANULOCYTES NFR BLD AUTO: 0.6 % — SIGNIFICANT CHANGE UP (ref 0–1.5)
INR BLD: 4.03 RATIO — HIGH (ref 0.88–1.16)
KETONES UR-MCNC: ABNORMAL
LEUKOCYTE ESTERASE UR-ACNC: ABNORMAL
LYMPHOCYTES # BLD AUTO: 1.27 K/UL — SIGNIFICANT CHANGE UP (ref 1–3.3)
LYMPHOCYTES # BLD AUTO: 15.5 % — SIGNIFICANT CHANGE UP (ref 13–44)
MCHC RBC-ENTMCNC: 30 PG — SIGNIFICANT CHANGE UP (ref 27–34)
MCHC RBC-ENTMCNC: 32.3 GM/DL — SIGNIFICANT CHANGE UP (ref 32–36)
MCV RBC AUTO: 92.8 FL — SIGNIFICANT CHANGE UP (ref 80–100)
MONOCYTES # BLD AUTO: 0.58 K/UL — SIGNIFICANT CHANGE UP (ref 0–0.9)
MONOCYTES NFR BLD AUTO: 7.1 % — SIGNIFICANT CHANGE UP (ref 2–14)
NEUTROPHILS # BLD AUTO: 6.18 K/UL — SIGNIFICANT CHANGE UP (ref 1.8–7.4)
NEUTROPHILS NFR BLD AUTO: 75.3 % — SIGNIFICANT CHANGE UP (ref 43–77)
NITRITE UR-MCNC: POSITIVE
PH UR: 7 — SIGNIFICANT CHANGE UP (ref 5–8)
PLATELET # BLD AUTO: 128 K/UL — LOW (ref 150–400)
POTASSIUM SERPL-MCNC: 4.6 MMOL/L — SIGNIFICANT CHANGE UP (ref 3.5–5.3)
POTASSIUM SERPL-SCNC: 4.6 MMOL/L — SIGNIFICANT CHANGE UP (ref 3.5–5.3)
PROT SERPL-MCNC: 6.5 G/DL — LOW (ref 6.6–8.7)
PROT UR-MCNC: 30 MG/DL
PROTHROM AB SERPL-ACNC: 43.7 SEC — HIGH (ref 10.6–13.6)
RBC # BLD: 3.77 M/UL — LOW (ref 3.8–5.2)
RBC # FLD: 13.2 % — SIGNIFICANT CHANGE UP (ref 10.3–14.5)
RBC CASTS # UR COMP ASSIST: ABNORMAL /HPF (ref 0–4)
SARS-COV-2 RNA SPEC QL NAA+PROBE: SIGNIFICANT CHANGE UP
SODIUM SERPL-SCNC: 140 MMOL/L — SIGNIFICANT CHANGE UP (ref 135–145)
SP GR SPEC: 1.01 — SIGNIFICANT CHANGE UP (ref 1.01–1.02)
UROBILINOGEN FLD QL: NEGATIVE — SIGNIFICANT CHANGE UP
WBC # BLD: 8.21 K/UL — SIGNIFICANT CHANGE UP (ref 3.8–10.5)
WBC # FLD AUTO: 8.21 K/UL — SIGNIFICANT CHANGE UP (ref 3.8–10.5)
WBC UR QL: ABNORMAL

## 2021-05-01 PROCEDURE — 72131 CT LUMBAR SPINE W/O DYE: CPT | Mod: 26,ME

## 2021-05-01 PROCEDURE — 72125 CT NECK SPINE W/O DYE: CPT | Mod: 26,MH

## 2021-05-01 PROCEDURE — 99285 EMERGENCY DEPT VISIT HI MDM: CPT | Mod: CS,GC

## 2021-05-01 PROCEDURE — 73590 X-RAY EXAM OF LOWER LEG: CPT | Mod: 26,RT

## 2021-05-01 PROCEDURE — 99222 1ST HOSP IP/OBS MODERATE 55: CPT | Mod: GC

## 2021-05-01 PROCEDURE — 70450 CT HEAD/BRAIN W/O DYE: CPT | Mod: 26,MH

## 2021-05-01 PROCEDURE — 71045 X-RAY EXAM CHEST 1 VIEW: CPT | Mod: 26

## 2021-05-01 PROCEDURE — 72128 CT CHEST SPINE W/O DYE: CPT | Mod: 26,ME

## 2021-05-01 PROCEDURE — G1004: CPT

## 2021-05-01 PROCEDURE — 73562 X-RAY EXAM OF KNEE 3: CPT | Mod: 26,RT

## 2021-05-01 PROCEDURE — 72192 CT PELVIS W/O DYE: CPT | Mod: 26,MA

## 2021-05-01 RX ORDER — LEVOTHYROXINE SODIUM 125 MCG
112 TABLET ORAL DAILY
Refills: 0 | Status: DISCONTINUED | OUTPATIENT
Start: 2021-05-01 | End: 2021-05-06

## 2021-05-01 RX ORDER — LEVETIRACETAM 250 MG/1
750 TABLET, FILM COATED ORAL
Refills: 0 | Status: DISCONTINUED | OUTPATIENT
Start: 2021-05-01 | End: 2021-05-06

## 2021-05-01 RX ORDER — FUROSEMIDE 40 MG
40 TABLET ORAL DAILY
Refills: 0 | Status: DISCONTINUED | OUTPATIENT
Start: 2021-05-01 | End: 2021-05-01

## 2021-05-01 RX ORDER — MORPHINE SULFATE 50 MG/1
4 CAPSULE, EXTENDED RELEASE ORAL ONCE
Refills: 0 | Status: DISCONTINUED | OUTPATIENT
Start: 2021-05-01 | End: 2021-05-01

## 2021-05-01 RX ORDER — DIPHENHYDRAMINE HCL 50 MG
50 CAPSULE ORAL ONCE
Refills: 0 | Status: COMPLETED | OUTPATIENT
Start: 2021-05-01 | End: 2021-05-01

## 2021-05-01 RX ORDER — SODIUM CHLORIDE 9 MG/ML
1000 INJECTION, SOLUTION INTRAVENOUS
Refills: 0 | Status: DISCONTINUED | OUTPATIENT
Start: 2021-05-01 | End: 2021-05-01

## 2021-05-01 RX ORDER — GLUCAGON INJECTION, SOLUTION 0.5 MG/.1ML
1 INJECTION, SOLUTION SUBCUTANEOUS ONCE
Refills: 0 | Status: DISCONTINUED | OUTPATIENT
Start: 2021-05-01 | End: 2021-05-02

## 2021-05-01 RX ORDER — CARVEDILOL PHOSPHATE 80 MG/1
6.25 CAPSULE, EXTENDED RELEASE ORAL
Refills: 0 | Status: DISCONTINUED | OUTPATIENT
Start: 2021-05-01 | End: 2021-05-02

## 2021-05-01 RX ORDER — DEXTROSE 50 % IN WATER 50 %
25 SYRINGE (ML) INTRAVENOUS ONCE
Refills: 0 | Status: DISCONTINUED | OUTPATIENT
Start: 2021-05-01 | End: 2021-05-02

## 2021-05-01 RX ORDER — HYDROCORTISONE 20 MG
67 TABLET ORAL ONCE
Refills: 0 | Status: COMPLETED | OUTPATIENT
Start: 2021-05-01 | End: 2021-05-01

## 2021-05-01 RX ORDER — INSULIN LISPRO 100/ML
VIAL (ML) SUBCUTANEOUS
Refills: 0 | Status: DISCONTINUED | OUTPATIENT
Start: 2021-05-01 | End: 2021-05-02

## 2021-05-01 RX ORDER — SODIUM CHLORIDE 9 MG/ML
1000 INJECTION, SOLUTION INTRAVENOUS
Refills: 0 | Status: DISCONTINUED | OUTPATIENT
Start: 2021-05-01 | End: 2021-05-02

## 2021-05-01 RX ORDER — DEXTROSE 50 % IN WATER 50 %
15 SYRINGE (ML) INTRAVENOUS ONCE
Refills: 0 | Status: DISCONTINUED | OUTPATIENT
Start: 2021-05-01 | End: 2021-05-02

## 2021-05-01 RX ORDER — WARFARIN SODIUM 2.5 MG/1
3 TABLET ORAL DAILY
Refills: 0 | Status: DISCONTINUED | OUTPATIENT
Start: 2021-05-01 | End: 2021-05-01

## 2021-05-01 RX ORDER — MORPHINE SULFATE 50 MG/1
2 CAPSULE, EXTENDED RELEASE ORAL ONCE
Refills: 0 | Status: DISCONTINUED | OUTPATIENT
Start: 2021-05-01 | End: 2021-05-01

## 2021-05-01 RX ORDER — SIMVASTATIN 20 MG/1
40 TABLET, FILM COATED ORAL AT BEDTIME
Refills: 0 | Status: DISCONTINUED | OUTPATIENT
Start: 2021-05-01 | End: 2021-05-06

## 2021-05-01 RX ORDER — DEXTROSE 50 % IN WATER 50 %
12.5 SYRINGE (ML) INTRAVENOUS ONCE
Refills: 0 | Status: DISCONTINUED | OUTPATIENT
Start: 2021-05-01 | End: 2021-05-02

## 2021-05-01 RX ADMIN — Medication 67 MILLIGRAM(S): at 20:58

## 2021-05-01 RX ADMIN — MORPHINE SULFATE 2 MILLIGRAM(S): 50 CAPSULE, EXTENDED RELEASE ORAL at 14:35

## 2021-05-01 RX ADMIN — MORPHINE SULFATE 4 MILLIGRAM(S): 50 CAPSULE, EXTENDED RELEASE ORAL at 16:37

## 2021-05-01 RX ADMIN — MORPHINE SULFATE 4 MILLIGRAM(S): 50 CAPSULE, EXTENDED RELEASE ORAL at 16:52

## 2021-05-01 RX ADMIN — SIMVASTATIN 40 MILLIGRAM(S): 20 TABLET, FILM COATED ORAL at 21:03

## 2021-05-01 RX ADMIN — LEVETIRACETAM 750 MILLIGRAM(S): 250 TABLET, FILM COATED ORAL at 18:36

## 2021-05-01 RX ADMIN — MORPHINE SULFATE 2 MILLIGRAM(S): 50 CAPSULE, EXTENDED RELEASE ORAL at 14:50

## 2021-05-01 NOTE — H&P ADULT - PROBLEM SELECTOR PLAN 2
hold Coumadin  will give Vitamin K   follow INR  if any bleeding complication should develop will need more rapid reversal

## 2021-05-01 NOTE — H&P ADULT - PROBLEM SELECTOR PLAN 1
Admit to SICU  maintain cervical collar  neurosurgery consult  neuro checks   will order CTA of the neck to exclude vascular injury

## 2021-05-01 NOTE — H&P ADULT - ATTENDING COMMENTS
Agree with above assessment.  The patient was seen and examined by me.  The patient reports that she fell and struck her head.  She denies LOC, complains of pain in the back of her head and neck.  She has no numbness or tingling.  The patient also endorses right knee and hip pain.  She has no chest or abdominal pain.  HEENT NC/AT PERRL EOMI no raccoon eyes, no benson signs, c-collar in place, there is no JVD trachea midline, there is tenderness in the posterior spine without step off deformity, chest with bilateral air entry, abdomen is soft and non tender, no pelvic tenderness or crepitus, there is right knee tenderness without gross deformity.  Head CT scan without intracranial bleed, there is atlantoaxial subluxation seen on CT of the head and neck, there is multilevel degenerative disease of the thoracic and lumbar spine, scoliosis of the spine is noted no acute fractures.  Pelvis CT no acute fracture seen.  X-ray of the right knee without acute traumatic injury.  The patient will be admitted to the SICU, maintain c-collar, neuro checks Q2, will order CTA of the neck to exclude vascular injury.  Need to verify AICD information to see if MR compatible.

## 2021-05-01 NOTE — ED PROVIDER NOTE - PROGRESS NOTE DETAILS
Srinivas Bergman, Resident: Pt continues to have midline cervical spinal tenderness, imaging concerning for atlantoaxial subluxation. Now right knee pain, and endorsing lower back pain, +tenderness on exam, will order imaging. Will consult neurosurgery. Srinivas Bergman, Resident: Trauma surgery consulted. Pain well controlled after medications. Srinivas Bergman, Resident: Neurosx recommending admission to trauma, pain well controlled, pt with no complaints at this time.

## 2021-05-01 NOTE — ED ADULT TRIAGE NOTE - CHIEF COMPLAINT QUOTE
Pt arrives s/p trip and fall witnessed by family who states pt hit back of head and is taking coumadin. Pt arrives at baseline, minimal complaint of head pain and is a&ox3. MD Diaz called for STAT eval. Pt arrives s/p trip and fall witnessed by family who states pt hit back of head and is taking coumadin, denies LOC. Pt arrives at baseline, minimal complaint of head pain and is a&ox3. MD Diaz called for STAT eval.

## 2021-05-01 NOTE — ED PROVIDER NOTE - OBJECTIVE STATEMENT
Pt is an 86 y.o. F hx of thyroid cancer, cardiomyopathy, DM, AVR on coumadin, HLD, and HTN, presenting after fall. Pt states she was walking with her walker, felt a momentary weakness in her right leg causing her to fall. Pt fell backward, hit her head, denies LOC.  called EMS, lives at home, and normally ambulates with a walker.

## 2021-05-01 NOTE — ED PROVIDER NOTE - CLINICAL SUMMARY MEDICAL DECISION MAKING FREE TEXT BOX
Pt is an 86 y.o. F hx of thyroid cancer, cardiomyopathy, DM, AVR on coumadin, HLD, and HTN, presents after fall, +head trauma, -LOC. Imaging, labs. Pt is an 86 y.o. F hx of thyroid cancer, cardiomyopathy, DM, AVR on coumadin, HLD, and HTN, presents after fall, +head trauma, -LOC. Imaging, labs, c-collar, fall risk protocol.

## 2021-05-01 NOTE — ED ADULT NURSE NOTE - NSIMPLEMENTINTERV_GEN_ALL_ED
Implemented All Fall with Harm Risk Interventions:  [place] to call system. Call bell, personal items and telephone within reach. Instruct patient to call for assistance. Room bathroom lighting operational. Non-slip footwear when patient is off stretcher. Physically safe environment: no spills, clutter or unnecessary equipment. Stretcher in lowest position, wheels locked, appropriate side rails in place. Provide visual cue, wrist band, yellow gown, etc. Monitor gait and stability. Monitor for mental status changes and reorient to person, place, and time. Review medications for side effects contributing to fall risk. Reinforce activity limits and safety measures with patient and family. Provide visual clues: red socks.

## 2021-05-01 NOTE — ED ADULT NURSE NOTE - ED STAT RN HANDOFF DETAILS
report given to ALLAN Cardenas in holding room. Pt stable, no complaints at this time. NAD noted, respirations even & unlabored. C-collar in place. Safety maintained.

## 2021-05-01 NOTE — H&P ADULT - ASSESSMENT
85yo F w PMH of arthritis, DM, HTN, cardiomyopathy s/p AICD placement, and biscusp aortic Valve s/p AVR (1993) on coumadin who presents after mechanical fall backwards, found with atlantoaxial subluxation. Reports also back pain without findings of acute fractures. Neurologically intact and hemodynamically stable otherwise,     # Atlantoaxial Subluxation  - Admit to trauma under Dr. Caldwell.   - Neurosurgery recommendations appreciated: c-collar on at all times, [place] ordered; q2 neurochecks; MRI of C-spine if AICD compatible.  - Pain control  - Resume pertinent home medications  - Sp/Sw eval for c-collar    #Supratherapeutic INR  - Will give K-centra  - Hold coumadin  - Daily INR  - Hold Chem DVT PPx at this time    Patient seen and discussed with Dr. Caldwell.

## 2021-05-01 NOTE — ED PROVIDER NOTE - PMH
Aortic valve, bicuspid    Cardiomyopathy    Diabetes  Diet controlled  Hyperlipidemia    Hypertension    ICD (implantable cardioverter-defibrillator) in place    Shortness of breath    Thyroid cancer    VT (ventricular tachycardia)

## 2021-05-01 NOTE — ED ADULT NURSE REASSESSMENT NOTE - NS ED NURSE REASSESS COMMENT FT1
Pt with one pair of yellow metal ball stud earrings which were placed in a labeled denture cup and then in a labeled belongings bag with pt gray sweatshirt. Bag tied shut and placed prominently on stretcher's IV pole.

## 2021-05-01 NOTE — H&P ADULT - NSICDXPASTSURGICALHX_GEN_ALL_CORE_FT
PAST SURGICAL HISTORY:  H/O abdominal hysterectomy     H/O aortic valve replacement     H/O thyroidectomy

## 2021-05-01 NOTE — H&P ADULT - NSHPPHYSICALEXAM_GEN_ALL_CORE
Constitutional: Well-developed elderly female in no acute distress  HEENT: Head is normocephalic with posterior scalp tenderness without laceration or abrasions, maxillofacial structures stable, no blood or discharge from nares or oral cavity, no benson sign / raccoon eyes, EOMI b/l, no active drainage or redness  Neck: cervical collar in place, trachea midline. Posterior c-spine tenderness.   Respiratory: respirations are unlabored, no accessory muscle use, no conversational dyspnea  Cardiovascular: Regular rate & rhythm, +S1, S2  Chest: Chest wall is non-tender to palpation, no subQ emphysema or crepitus palpated  Gastrointestinal: Abdomen soft, non-tender, non-distended, no rebound tenderness / guarding, no ecchymosis or external signs of abdominal trauma  Extremities: moving all extremities spontaneously, Right knee tender to palpation. B/l LE with venous stasis ulcers, healed on left and with xeroform on right shin  Vascular: 2+ radial and PT. 1+ PT bilaterally   Neurological: GCS: 15 (4/5/6). A&O x 3; no gross sensory / motor / coordination deficits  Back: C- spine tenderness. Mild L-spine tenderness to palpation, no step-offs or signs of external trauma to the back

## 2021-05-01 NOTE — H&P ADULT - NSHPLABSRESULTS_GEN_ALL_CORE
Vital Signs Last 24 Hrs  T(C): 36.7 (01 May 2021 15:38), Max: 36.7 (01 May 2021 15:38)  T(F): 98.1 (01 May 2021 15:38), Max: 98.1 (01 May 2021 15:38)  HR: 66 (01 May 2021 15:38) (65 - 66)  BP: 151/82 (01 May 2021 15:38) (145/79 - 151/82)  BP(mean): --  RR: 18 (01 May 2021 15:38) (16 - 18)  SpO2: 96% (01 May 2021 15:38) (96% - 98%)        LABS:                        11.3   8.21  )-----------( 128      ( 01 May 2021 14:52 )             35.0     05-01    140  |  100  |  36.0<H>  ----------------------------<  89  4.6   |  28.0  |  0.93    Ca    9.3      01 May 2021 14:52    TPro  6.5<L>  /  Alb  3.6  /  TBili  0.4  /  DBili  x   /  AST  318<H>  /  ALT  101<H>  /  AlkPhos  75  05-01    PT/INR - ( 01 May 2021 14:52 )   PT: 43.7 sec;   INR: 4.03 ratio         PTT - ( 01 May 2021 14:52 )  PTT:40.1 sec        IMAGING:   CTH:  IMPRESSION:  1) scattered chronic ischemic changes with volume loss. Incidentally noted is upward migration of the odontoid with basilar invagination..  2) no intracerebral hemorrhage, contusion, or extracerebral collections are identified.    Follow-up MR imaging of the cervical spine is recommended along with further clinical correlation      KASANDRA DRIVER MD; Attending Radiologist  This document has been electronically signed. May 1 2021 11:48AM      CT C-spine:  IMPRESSION:  1. No acute fracture identified.  2. 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 This was not present on 4/11/2017. Cervical MR imaging recommended for further assessment.  3. Advanced degenerative changes throughout the cervical region.      KASANDRA DRIVER MD; Attending Radiologist  This document has been electronically signed. May 1 2021 11:51AM      CT T/L-Spine:  IMPRESSION:   Focal kyphosis at T7-8. S-shaped scoliosis of the thoracolumbar spine is also noted. Multilevel degenerative disc disease and spondylosis at T1-2 through L3-4 with loss of disc height and associated degenerative endplate changes. Posterior osteophytic ridge/disc complex at T1-2 abuts the ventral cord. Disc bulges at L1-2 through L5-S1 flatten the ventral thecal sac and narrow the BILATERAL neural foramina. Moderate central stenosis at L2-3 and L4-5 and severe central stenosis at L3-4 are present secondary to disc bulge, facet osteophytic hypertrophy and redundancy of ligamentum flavum. Facet osteophytic hypertrophy also noted at L5-S1. No acute vertebral fracture is recognized      RAHUL BAEZ MD; Attending Radiologist  This document has been electronically signed. May 1 2021 3:42PM        CT Pelvis:  IMPRESSION:  No acute fracture or dislocation.      KASANDRA MENENDEZ MD; Attending Radiologist  This document has been electronically signed. May 1 2021 12:27PM      CXR:  IMPRESSION:   No gross consolidation is seen. Status post sternotomy. Pacemaker present.    RAKEL KRUGER MD; Attending Radiologist  This document has been electronically signed. May 1 2021 3:21PM      XR R Tib-Fib and Knee:  No acute Frx or dislocations.

## 2021-05-01 NOTE — CONSULT NOTE ADULT - ASSESSMENT
85 y/o female with PMH significant for Aortic valve replacement, Pacemaker/AICD on coumadin, s/p fall at home today, striking the back of her neck. Denies LOC. CT C Spine shows upward subluxation of the odontoid into the foramen magnum.     1. Admit to trauma  2. Recommend MRI C Spine with thin cuts through the skull base if pacemaker/AICD compatible.   3. Cervical collar at all times, will order a [place] j collar  4. Pain control as needed  5. Neuro checks Q2hrs.   87 y/o female with PMH significant for Aortic valve replacement, Pacemaker/AICD on coumadin, s/p fall at home today, striking the back of her neck. Denies LOC. CT C Spine shows upward subluxation of the odontoid into the foramen magnum.     1. Admit to trauma  2. Recommend MRI C Spine with thin cuts through the skull base if pacemaker/AICD compatible.   3. Cervical collar at all times, will order a [place] j collar  4. Pain control as needed  5. Neuro checks Q2hrs.    --    NSGY Attg:    see above    patient seen and examined by PA staff    imaging reviewed    agree with plan as above

## 2021-05-01 NOTE — CONSULT NOTE ADULT - SUBJECTIVE AND OBJECTIVE BOX
HPI: Patient is a 86y old  Female who presents with a chief complaint of fall at home. She states that she lost her footing while switching walkers with her . She fell backwards into a recliner, striking the back of her neck. She c/o posterior neck pain to palpation, mid to upper cervical area. She states she has severe pain in the right thigh & knee.  Denies LOC. Denies HA's/N/V, chills, SOB      PAST MEDICAL & SURGICAL HISTORY:  ICD (implantable cardioverter-defibrillator) in place    Hypertension    Hyperlipidemia    Shortness of breath    Aortic valve, bicuspid    Diabetes  Diet controlled    Cardiomyopathy    Thyroid cancer    VT (ventricular tachycardia)    H/O aortic valve replacement 1990's    H/O abdominal hysterectomy    H/O thyroidectomy      FAMILY HISTORY:  No pertinent family history in first degree relatives        SOCIAL HISTORY:  Tobacco Use: denies  EtOH use: denies  Substance: denies    Allergies    iodine (Urticaria; Rash; Hives)    Intolerances        REVIEW OF SYSTEMS  Negative except as noted in HPI  CONSTITUTIONAL: No fever, weight loss  EYES: No eye pain or  discharge  ENMT:  No  tinnitus, vertigo; No sinus or throat pain  RESPIRATORY: No cough, wheezing, chills or hemoptysis; No shortness of breath  CARDIOVASCULAR: No chest pain   GASTROINTESTINAL: No abdominal or epigastric pain. No nausea, vomiting  GENITOURINARY: No dysuria, frequency  NEUROLOGICAL: No headaches, loss of strength, numbness, or tremors  MUSCULOSKELETAL: +  joint pain in right knee or + swelling; +  right extremity thigh pain  PSYCHIATRIC: No depression, anxiety, mood swings  HEME/LYMPH: + easy bruising      HOME MEDICATIONS:  Home Medications:  carvedilol 6.25 mg oral tablet: 1 tab(s) orally 2 times a day (11 Apr 2017 12:14)  celecoxib 200 mg oral capsule: 1 cap(s) orally 2 times a day (11 Apr 2017 12:14)  Coumadin 3 mg oral tablet: 1 tab(s) orally once a day (11 Apr 2017 12:14)  folic acid 1 mg oral tablet: 1 tab(s) orally once a day (11 Apr 2017 12:14)  furosemide 40 mg oral tablet:  orally 2 times a day (11 Apr 2017 12:14)  levETIRAcetam 750 mg oral tablet: 1 tab(s) orally 2 times a day (11 Apr 2017 12:14)  simvastatin 40 mg oral tablet: 1 tab(s) orally once a day (at bedtime) (11 Apr 2017 12:14)  Synthroid 100 mcg (0.1 mg) oral tablet: 1 tab(s) orally once a day (11 Apr 2017 12:14)        Vital Signs Last 24 Hrs  T(C): 36.7 (01 May 2021 15:38), Max: 36.7 (01 May 2021 15:38)  T(F): 98.1 (01 May 2021 15:38), Max: 98.1 (01 May 2021 15:38)  HR: 66 (01 May 2021 15:38) (65 - 66)  BP: 151/82 (01 May 2021 15:38) (145/79 - 151/82)  BP(mean): --  RR: 18 (01 May 2021 15:38) (16 - 18)  SpO2: 96% (01 May 2021 15:38) (96% - 98%)      PHYSICAL EXAM:  GENERAL: Well-developed, elderly female who appears stated age  HEAD:  Atraumatic, normocephalic  EYES: Conjunctiva and sclera clear  NECK: Supple  NICOLAS COMA SCORE: E- V- M- = 15       E: 4= opens eyes spontaneously        V: 5= oriented       M: 6= follows commands   MENTAL STATUS: AAO x3,  Appropriately conversant without aphasia, following simple commands  CRANIAL NERVES:  EOMI without nystagmus. Facial sensation intact V1-3 distribution b/l. Face symmetric w/ normal eye closure and smile, tongue midline. [place]',  Speech clear. Head turning and shoulder shrug intact.   MOTOR: strength 5/5 b/l upper and lower extremities  Uppers     Delt (C5/6)     Bicep (C5/6)          Tricep (C7)     HG (C8/T1)  R                     5/5                 5/5                 5/5                    5/5                     L                      5/5                 5/5                5/5                     5/5                     Lowers      HF(L1/L2)     KE (L3)     DF (L4)     EHL (L5)     PF (S1)         L                     5/5               5/5          5/5            4/5            5/5  Pt refusing to move right lower extremity secondary to thigh & knee pain.   SENSATION: grossly intact to light touch all extremities  HEART: +S1/+S2; Regular rate and rhythm; no murmurs, rubs, or gallops  ABDOMEN: Soft, nontender  EXTREMITIES:   no clubbing, cyanosis  SKIN: Warm, lower extremity skin color changes, multiple small wounds that are scabbed.      LABS:                        11.3   8.21  )-----------( 128      ( 01 May 2021 14:52 )             35.0     05-01    140  |  100  |  36.0<H>  ----------------------------<  89  4.6   |  28.0  |  0.93    Ca    9.3      01 May 2021 14:52    TPro  6.5<L>  /  Alb  3.6  /  TBili  0.4  /  DBili  x   /  AST  318<H>  /  ALT  101<H>  /  AlkPhos  75  05-01    PT/INR - ( 01 May 2021 14:52 )   PT: 43.7 sec;   INR: 4.03 ratio         PTT - ( 01 May 2021 14:52 )  PTT:40.1 sec      RADIOLOGY & ADDITIONAL STUDIES:      CT Head No Cont (05.01.21 @ 11:39)     HEMISPHERES: There are small vessel ischemic changes in the white matter of both hemispheres. Also there is volume loss and involutional change. No acute infarct or hemorrhagic lesion is noted.  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 This has progressed and was not present on 4/11/2017. Further workup and follow-up MR imaging recommended.  EXTRACEREBRAL SPACES:  No subdural or epidural collections are noted.  SKULL BASE AND CALVARIUM:  Appears intact.  No fracture or destructive lesion is identified.  SINUSES AND MASTOIDS:  Clear.  MISCELLANEOUS:  No orbital or suprasellar abnormality noted.       CT Cervical Spine No Cont (05.01.21 @ 11:39)     There is alteration of the cervical lordosis which may reflect positioning or spasm.    C1/C2:  There is upward subluxation of migration of the odontoid into the foramen magnum. In conjunction there is atlantoaxial subluxation. Degenerative changes are noted at C1-C2. No acute fracture is suggested.    There is a chronic compression deformity of C6. No acute cervical fracture is noted.    There are chronic arthritic and spondylotic changes throughout the cervical region with central and foraminal narrowing.    Carotid arteries are calcified.          CT Lumbar Spine No Cont (05.01.21 @ 15:07)     IMPRESSION:  Focalkyphosis at T7-8. S-shaped scoliosis of the thoracolumbar spine is also noted. Multilevel degenerative disc disease and spondylosis at T1-2 through L3-4 with loss of disc height and associated degenerative endplate changes. Posterior osteophytic ridge/disc complex at T1-2 abuts the ventral cord. Disc bulges at L1-2 through L5-S1 flatten the ventral thecal sac and narrow the BILATERAL neural foramina. Moderate central stenosis at L2-3 and L4-5 and severe central stenosis at L3-4 are present secondary to disc bulge, facet osteophytic hypertrophy and redundancy of ligamentum flavum. Facet osteophytic hypertrophy also noted at L5-S1.  No acute vertebral fracture is recognized.          CAPRINI SCORE [CLOT]:  Patient has an estimated Caprini score of greater than 5.  However, the patient's unique clinical situation will be addressed in an individual manner to determine appropriate anticoagulation treatment, if any.

## 2021-05-01 NOTE — ED ADULT NURSE NOTE - CHIEF COMPLAINT QUOTE
Pt arrives s/p trip and fall witnessed by family who states pt hit back of head and is taking coumadin, denies LOC. Pt arrives at baseline, minimal complaint of head pain and is a&ox3. MD Diaz called for STAT eval.

## 2021-05-01 NOTE — ED PROVIDER NOTE - ATTENDING CONTRIBUTION TO CARE
Pt is an 86 y.o. F hx of thyroid cancer, cardiomyopathy, DM, AVR on coumadin, HLD, and HTN, presents after fall, +head trauma, -LOC. Imaging, labs, c-collar, fall risk protocol.

## 2021-05-01 NOTE — ED ADULT NURSE NOTE - OBJECTIVE STATEMENT
pt A&O x 4 came to ED from home s/p trip and fall on coumadin. Pt reports hitting head but denies LOC or any visible injuries. Pt c/o slight headache but denies any other symptoms at this time. Denies any chest pain, SOB, N/V/D, dizziness, blurred vision, numbness/tingling,  symptoms. No s/s of respiratory distress noted- respirations even & unlabored. Safety maintained.

## 2021-05-01 NOTE — H&P ADULT - HISTORY OF PRESENT ILLNESS
Consulted at 15:20, seen at 15:35.    87yo F w PMH of arthritis, DM, HTN, cardiomyopathy s/p AICD placement, and biscusp aortic Valve s/p AVR (1993) on coumadin who presents after mechanical fall backwards, landing on head. Patient was transferring from her walker to her 's when he accidently let go and the walker slipped. Patient landed backwards onto head, but denies loss of consciousness. Complains or posterior head/ neck pain as well as lower back pain, Denies fevers, chills, chest pain, SOB, paralysis, headache, nausea, or vomiting. Of note, patient is Yazidi does not want to receive blood products. INR supratherapeutic at 4.1, reportedly 5 on recent testing 2 days ago so has stopped coumadin for past 2 days.

## 2021-05-01 NOTE — H&P ADULT - NSICDXPASTMEDICALHX_GEN_ALL_CORE_FT
PAST MEDICAL HISTORY:  Aortic valve, bicuspid     Cardiomyopathy     Diabetes Diet controlled    Hyperlipidemia     Hypertension     ICD (implantable cardioverter-defibrillator) in place     Shortness of breath     Thyroid cancer     VT (ventricular tachycardia)

## 2021-05-01 NOTE — ED PROVIDER NOTE - CARE PLAN
Principal Discharge DX:	Atlantoaxial subluxation, initial encounter  Secondary Diagnosis:	Fall, initial encounter

## 2021-05-02 LAB
A1C WITH ESTIMATED AVERAGE GLUCOSE RESULT: 5.5 % — SIGNIFICANT CHANGE UP (ref 4–5.6)
ANION GAP SERPL CALC-SCNC: 18 MMOL/L — HIGH (ref 5–17)
APTT BLD: 39 SEC — HIGH (ref 27.5–35.5)
BASOPHILS # BLD AUTO: 0.03 K/UL — SIGNIFICANT CHANGE UP (ref 0–0.2)
BASOPHILS NFR BLD AUTO: 0.4 % — SIGNIFICANT CHANGE UP (ref 0–2)
BUN SERPL-MCNC: 32 MG/DL — HIGH (ref 8–20)
CALCIUM SERPL-MCNC: 9.2 MG/DL — SIGNIFICANT CHANGE UP (ref 8.6–10.2)
CHLORIDE SERPL-SCNC: 100 MMOL/L — SIGNIFICANT CHANGE UP (ref 98–107)
CO2 SERPL-SCNC: 24 MMOL/L — SIGNIFICANT CHANGE UP (ref 22–29)
COVID-19 SPIKE DOMAIN AB INTERP: NEGATIVE — SIGNIFICANT CHANGE UP
COVID-19 SPIKE DOMAIN ANTIBODY RESULT: 0.4 U/ML — SIGNIFICANT CHANGE UP
CREAT SERPL-MCNC: 1 MG/DL — SIGNIFICANT CHANGE UP (ref 0.5–1.3)
EOSINOPHIL # BLD AUTO: 0.01 K/UL — SIGNIFICANT CHANGE UP (ref 0–0.5)
EOSINOPHIL NFR BLD AUTO: 0.1 % — SIGNIFICANT CHANGE UP (ref 0–6)
ESTIMATED AVERAGE GLUCOSE: 111 MG/DL — SIGNIFICANT CHANGE UP (ref 68–114)
GLUCOSE BLDC GLUCOMTR-MCNC: 119 MG/DL — HIGH (ref 70–99)
GLUCOSE BLDC GLUCOMTR-MCNC: 124 MG/DL — HIGH (ref 70–99)
GLUCOSE BLDC GLUCOMTR-MCNC: 163 MG/DL — HIGH (ref 70–99)
GLUCOSE BLDC GLUCOMTR-MCNC: 200 MG/DL — HIGH (ref 70–99)
GLUCOSE BLDC GLUCOMTR-MCNC: 240 MG/DL — HIGH (ref 70–99)
GLUCOSE SERPL-MCNC: 120 MG/DL — HIGH (ref 70–99)
HCT VFR BLD CALC: 35.7 % — SIGNIFICANT CHANGE UP (ref 34.5–45)
HGB BLD-MCNC: 11.3 G/DL — LOW (ref 11.5–15.5)
IMM GRANULOCYTES NFR BLD AUTO: 0.6 % — SIGNIFICANT CHANGE UP (ref 0–1.5)
INR BLD: 4.66 RATIO — HIGH (ref 0.88–1.16)
LYMPHOCYTES # BLD AUTO: 0.93 K/UL — LOW (ref 1–3.3)
LYMPHOCYTES # BLD AUTO: 13.2 % — SIGNIFICANT CHANGE UP (ref 13–44)
MAGNESIUM SERPL-MCNC: 1.7 MG/DL — SIGNIFICANT CHANGE UP (ref 1.6–2.6)
MCHC RBC-ENTMCNC: 30.1 PG — SIGNIFICANT CHANGE UP (ref 27–34)
MCHC RBC-ENTMCNC: 31.7 GM/DL — LOW (ref 32–36)
MCV RBC AUTO: 95.2 FL — SIGNIFICANT CHANGE UP (ref 80–100)
MONOCYTES # BLD AUTO: 0.22 K/UL — SIGNIFICANT CHANGE UP (ref 0–0.9)
MONOCYTES NFR BLD AUTO: 3.1 % — SIGNIFICANT CHANGE UP (ref 2–14)
NEUTROPHILS # BLD AUTO: 5.82 K/UL — SIGNIFICANT CHANGE UP (ref 1.8–7.4)
NEUTROPHILS NFR BLD AUTO: 82.6 % — HIGH (ref 43–77)
PHOSPHATE SERPL-MCNC: 4.2 MG/DL — SIGNIFICANT CHANGE UP (ref 2.4–4.7)
PLATELET # BLD AUTO: 115 K/UL — LOW (ref 150–400)
POTASSIUM SERPL-MCNC: 4.3 MMOL/L — SIGNIFICANT CHANGE UP (ref 3.5–5.3)
POTASSIUM SERPL-SCNC: 4.3 MMOL/L — SIGNIFICANT CHANGE UP (ref 3.5–5.3)
PROTHROM AB SERPL-ACNC: 50.2 SEC — HIGH (ref 10.6–13.6)
RBC # BLD: 3.75 M/UL — LOW (ref 3.8–5.2)
RBC # FLD: 13.2 % — SIGNIFICANT CHANGE UP (ref 10.3–14.5)
SARS-COV-2 IGG+IGM SERPL QL IA: 0.4 U/ML — SIGNIFICANT CHANGE UP
SARS-COV-2 IGG+IGM SERPL QL IA: NEGATIVE — SIGNIFICANT CHANGE UP
SODIUM SERPL-SCNC: 142 MMOL/L — SIGNIFICANT CHANGE UP (ref 135–145)
WBC # BLD: 7.05 K/UL — SIGNIFICANT CHANGE UP (ref 3.8–10.5)
WBC # FLD AUTO: 7.05 K/UL — SIGNIFICANT CHANGE UP (ref 3.8–10.5)

## 2021-05-02 PROCEDURE — 99232 SBSQ HOSP IP/OBS MODERATE 35: CPT

## 2021-05-02 PROCEDURE — 93306 TTE W/DOPPLER COMPLETE: CPT | Mod: 26

## 2021-05-02 PROCEDURE — 73700 CT LOWER EXTREMITY W/O DYE: CPT | Mod: 26,RT

## 2021-05-02 PROCEDURE — 70498 CT ANGIOGRAPHY NECK: CPT | Mod: 26

## 2021-05-02 PROCEDURE — 99222 1ST HOSP IP/OBS MODERATE 55: CPT

## 2021-05-02 RX ORDER — INSULIN LISPRO 100/ML
VIAL (ML) SUBCUTANEOUS
Refills: 0 | Status: DISCONTINUED | OUTPATIENT
Start: 2021-05-02 | End: 2021-05-06

## 2021-05-02 RX ORDER — INSULIN LISPRO 100/ML
VIAL (ML) SUBCUTANEOUS
Refills: 0 | Status: DISCONTINUED | OUTPATIENT
Start: 2021-05-02 | End: 2021-05-02

## 2021-05-02 RX ORDER — CARVEDILOL PHOSPHATE 80 MG/1
6.25 CAPSULE, EXTENDED RELEASE ORAL EVERY 12 HOURS
Refills: 0 | Status: DISCONTINUED | OUTPATIENT
Start: 2021-05-02 | End: 2021-05-06

## 2021-05-02 RX ORDER — ACETAMINOPHEN 500 MG
1000 TABLET ORAL ONCE
Refills: 0 | Status: COMPLETED | OUTPATIENT
Start: 2021-05-02 | End: 2021-05-02

## 2021-05-02 RX ORDER — LANOLIN ALCOHOL/MO/W.PET/CERES
3 CREAM (GRAM) TOPICAL AT BEDTIME
Refills: 0 | Status: DISCONTINUED | OUTPATIENT
Start: 2021-05-02 | End: 2021-05-06

## 2021-05-02 RX ADMIN — Medication 400 MILLIGRAM(S): at 05:37

## 2021-05-02 RX ADMIN — LEVETIRACETAM 750 MILLIGRAM(S): 250 TABLET, FILM COATED ORAL at 05:37

## 2021-05-02 RX ADMIN — Medication 1000 MILLIGRAM(S): at 05:50

## 2021-05-02 RX ADMIN — LEVETIRACETAM 750 MILLIGRAM(S): 250 TABLET, FILM COATED ORAL at 18:07

## 2021-05-02 RX ADMIN — SODIUM CHLORIDE 75 MILLILITER(S): 9 INJECTION, SOLUTION INTRAVENOUS at 00:48

## 2021-05-02 RX ADMIN — Medication 50 MILLIGRAM(S): at 01:12

## 2021-05-02 RX ADMIN — Medication 1: at 22:29

## 2021-05-02 RX ADMIN — CARVEDILOL PHOSPHATE 6.25 MILLIGRAM(S): 80 CAPSULE, EXTENDED RELEASE ORAL at 18:07

## 2021-05-02 RX ADMIN — Medication 1: at 18:07

## 2021-05-02 RX ADMIN — CARVEDILOL PHOSPHATE 6.25 MILLIGRAM(S): 80 CAPSULE, EXTENDED RELEASE ORAL at 05:40

## 2021-05-02 RX ADMIN — Medication 112 MICROGRAM(S): at 05:37

## 2021-05-02 RX ADMIN — Medication 2: at 12:19

## 2021-05-02 RX ADMIN — SIMVASTATIN 40 MILLIGRAM(S): 20 TABLET, FILM COATED ORAL at 22:13

## 2021-05-02 RX ADMIN — Medication 3 MILLIGRAM(S): at 22:13

## 2021-05-02 NOTE — PROGRESS NOTE ADULT - SUBJECTIVE AND OBJECTIVE BOX
HISTORY  86y Female with atlanto-occipital subluxation, neurologically intact    24 HOUR EVENTS: admitted to the ICU for frequent neuro checks, CTA done to r/o BCVI pending results. C/o R Knee pain, very tender to palpation, compartments are soft, CT knee ordered and awaiting results.       SUBJECTIVE/ROS:  [ x ] A ten-point review of systems was otherwise negative except as noted.  [ ] Due to altered mental status/intubation, subjective information were not able to be obtained from the patient. History was obtained, to the extent possible, from review of the chart and collateral sources of information.      NEURO  RASS:   0  GCS:  15   CAM ICU: Neg  Exam: BENAVIDEZ, non focal  Meds: levETIRAcetam 750 milliGRAM(s) Oral two times a day    [x] Adequacy of sedation and pain control has been assessed and adjusted      RESPIRATORY  RR: 12 (05-02-21 @ 05:00) (9 - 18)  SpO2: 100% (05-02-21 @ 05:00) (96% - 100%)  Wt(kg): --  Exam: unlabored, clear to auscultation bilaterally  Mechanical Ventilation:     [ ] Extubation Readiness Assessed  Meds:       CARDIOVASCULAR  HR: 61 (05-02-21 @ 05:00) (60 - 74)  BP: 124/48 (05-02-21 @ 05:00) (115/64 - 151/82)  BP(mean): 68 (05-02-21 @ 05:00) (68 - 91)  ABP: --  ABP(mean): --  Wt(kg): --  CVP(cm H2O): --      Exam: s1s2  Cardiac Rhythm: paced  Perfusion     [ x]Adequate   [ ]Inadequate  Mentation   [ x]Normal       [ ]Reduced  Extremities  [ x]Warm         [ ]Cool  Volume Status [ ]Hypervolemic [  x]Euvolemic [ ]Hypovolemic  Meds: carvedilol Oral Tab/Cap - Peds 6.25 milliGRAM(s) Oral two times a day        GI/NUTRITION  Exam: soft nt nd  Diet: npo x meds  Meds:     GENITOURINARY  I&O's Detail    05-01 @ 07:01  -  05-02 @ 05:59  --------------------------------------------------------  IN:    IV PiggyBack: 100 mL    multiple electrolytes Injection Type 1.: 375 mL  Total IN: 475 mL    OUT:    Voided (mL): 350 mL  Total OUT: 350 mL    Total NET: 125 mL        Weight (kg): 67 (05-01 @ 17:34)  05-02    142  |  100  |  32.0<H>  ----------------------------<  120<H>  4.3   |  24.0  |  1.00    Ca    9.2      02 May 2021 04:23  Phos  4.2     05-02  Mg     1.7     05-02    TPro  6.5<L>  /  Alb  3.6  /  TBili  0.4  /  DBili  x   /  AST  318<H>  /  ALT  101<H>  /  AlkPhos  75  05-01    [ ] Cruz catheter, indication: N/A  Meds: multiple electrolytes Injection Type 1 1000 milliLiter(s) IV Continuous <Continuous>        HEMATOLOGIC  Meds:   [x] VTE Prophylaxis                        11.3   7.05  )-----------( 115      ( 02 May 2021 04:23 )             35.7     PT/INR - ( 02 May 2021 04:23 )   PT: 50.2 sec;   INR: 4.66 ratio         PTT - ( 02 May 2021 04:23 )  PTT:39.0 sec  Transfusion     [ ] PRBC   [ ] Platelets   [ ] FFP   [ ] Cryoprecipitate      INFECTIOUS DISEASES  T(C): 36.9 (05-01-21 @ 23:48), Max: 37 (05-01-21 @ 20:04)  Wt(kg): --  WBC Count: 7.05 K/uL (05-02 @ 04:23)  WBC Count: 8.21 K/uL (05-01 @ 14:52)    Recent Cultures:    Meds:       ENDOCRINE  Capillary Blood Glucose    Meds: dextrose 40% Gel 15 Gram(s) Oral once  dextrose 50% Injectable 25 Gram(s) IV Push once  dextrose 50% Injectable 12.5 Gram(s) IV Push once  dextrose 50% Injectable 25 Gram(s) IV Push once  glucagon  Injectable 1 milliGRAM(s) IntraMuscular once  insulin lispro (ADMELOG) corrective regimen sliding scale   SubCutaneous three times a day before meals  levothyroxine 112 MICROGram(s) Oral daily  simvastatin 40 milliGRAM(s) Oral at bedtime        ACCESS DEVICES:  [ x ] Peripheral IV  [ ] Central Venous Line	[ ] R	[ ] L	[ ] IJ	[ ] Fem	[ ] SC	Placed:   [ ] Arterial Line		[ ] R	[ ] L	[ ] Fem	[ ] Rad	[ ] Ax	Placed:   [ ] PICC:					[ ] Mediport  [ ] Urinary Catheter, Date Placed:   [ ] Necessity of urinary, arterial, and venous catheters discussed    OTHER MEDICATIONS:      CODE STATUS:     IMAGING:    ____ minutes of critical care time spent providing medical care for patient's acute illness/conditions that impairs at least one vital organ system and/or poses a high risk of imminent or life threatening deterioration in the patient's condition. It includes time spent evaluating and treating the patient's acute illness as well as time spent reviewing labs, radiology, discussing goals of care with patient and/or patient's family, and discussing the case with a multidisciplinary team in an effort to prevent further life threatening deterioration or end organ damage. This time is independent of any procedures performed.

## 2021-05-02 NOTE — CONSULT NOTE ADULT - ASSESSMENT
85yo female w/PMH PMH rheumatic heart disease, bicuspid aortic valve s/p mechanical AVR 1993 on coumadin (noted abnormal stenosis on 4/2019 TTE-pt refused reop AVR), BiV ICD (MDT-MRI incompatible) placed after sudden death 1993 w/gen change/upgrade 2016, diastolic HFpEF (4/2019 EF 67%. Gr II DD). Patient presented to the ED s/p mechanical fall, was transferring from her walker to 's seated walker, fell back hitting head/neck on chair, denies LOC.  Now w/suspected C1, C2 fx in c-collar. Of note INR has been supratherapeutic, currently 4.1, has been holding coumadin since Friday 4/30 per Dr. Prieto instruction.  Patient Jehova's witness and would not like to receive any blood product. c/o neck and lower back pain s/p fall. Denies exertional chest pain/pressure, palpitations, irregular and/or rapid heart beat, SOB, BURGOS, syncope/near syncope, dizziness, orthopnea, PND, cough, edema, f/c, n/v/d, hematuria, or hematochezia.     Mechanical AVR  -TTE pending  -c/t hold coumadin, goal INR 2. AC resumption as soon as surgery deems safe    Diastolic CHFpEF  -TTE pending  -appears euvolemic/dry  -resume home Lasix 20mg PO BID  -c/w coreg 6.25mg BID    Cardiac clearance  -MDT BiV ICD NOT MRI compatible per EP. Interrogation pending  -TTE pending    Elle Rodriguez, NP  [phone]     Assessment and recommendations are final when note is signed by the attending.       85yo female w/PMH PMH cPatient presented to the ED s/p mechanical fall, was transferring from her walker to 's seated walker, fell back hitting head/neck on chair, denies LOC.  Now w/suspected C1, C2 fx in c-collar. Of note INR has been supratherapeutic, currently 4.1, has been holding coumadin since Friday 4/30 per Dr. Prieto instruction.  Patient Jehova's witness and would not like to receive any blood product. c/o neck and lower back pain s/p fall. Denies exertional chest pain/pressure, palpitations, irregular and/or rapid heart beat, SOB, BURGOS, syncope/near syncope, dizziness, orthopnea, PND, cough, edema, f/c, n/v/d, hematuria, or hematochezia.     Mechanical AVR  -TTE pending  -c/t hold coumadin, goal INR 2. AC resumption as soon as surgery deems safe    Diastolic CHFpEF  -TTE pending  -appears euvolemic/dry  -resume home Lasix 20mg PO BID  -c/w coreg 6.25mg BID    Cardiac clearance  -MDT BiV ICD NOT MRI compatible per EP. Interrogation pending  -TTE pending    Elle Rodriguez NP  [phone]     Assessment and recommendations are final when note is signed by the attending.

## 2021-05-02 NOTE — PROGRESS NOTE ADULT - ASSESSMENT
86F with atlanto-occipital subluxation, neurologically intact  Neuro: FU CTA official results, neurochecks for now will need to confirm mechanical heart valve and AICD mri compatibility, maintain c-collar  Pulm: OOB, PT, FU R knee CT  Card: Cards consult called  GI: dysphagia screen, full enteral nutrition if ok  Renal: no new issues  ID: no new issues  Heme: INR on admission 4.0, no indication for urgent reversal  Skin: large area of non-blanching ecchymosis concerning for DTI buttocks and upper thighs, b/l heel ulcers and IAD of the perineum.  Dispo: continue neuro checks for now in ICU

## 2021-05-02 NOTE — GOALS OF CARE CONVERSATION - ADVANCED CARE PLANNING - CONVERSATION DETAILS
Discussed goals of care including code status and expected discharge. At this time patient would like to remain full code and would accept a trial of mechanical intubation but would not want to be reliant on machines. Prior to her fall she has been able to ambulate short distances with a walker but reports more difficulty lately, her  helps with ADLs. She expects to get back to the same level of function. I explained that we would have PT work with her to ensure a safe discharge and that rehab might be recommended.

## 2021-05-02 NOTE — PROGRESS NOTE ADULT - SUBJECTIVE AND OBJECTIVE BOX
· Subjective and Objective:   HPI: Patient is a 86y old  Female who presents with a chief complaint of fall at home. She states that she lost her footing while switching walkers with her . She fell backwards into a recliner, striking the back of her neck. She c/o posterior neck pain to palpation, mid to upper cervical area. She states she has severe pain in the right thigh & knee.  Denies LOC. Denies HA's/N/V, chills, SOB    Neurosurgery following for atlanto-axial subluxation     PAST MEDICAL & SURGICAL HISTORY:  ICD (implantable cardioverter-defibrillator) in place    Hypertension    Hyperlipidemia    Shortness of breath    Aortic valve, bicuspid    Diabetes  Diet controlled    Cardiomyopathy    Thyroid cancer    VT (ventricular tachycardia)    H/O aortic valve replacement 1990's    H/O abdominal hysterectomy    H/O thyroidectomy      FAMILY HISTORY:  No pertinent family history in first degree relatives        SOCIAL HISTORY:  Tobacco Use: denies  EtOH use: denies  Substance: denies    Allergies    iodine (Urticaria; Rash; Hives)    Intolerances        REVIEW OF SYSTEMS  Negative except as noted in HPI  CONSTITUTIONAL: No fever, weight loss  EYES: No eye pain or  discharge  ENMT:  No  tinnitus, vertigo; No sinus or throat pain  RESPIRATORY: No cough, wheezing, chills or hemoptysis; No shortness of breath  CARDIOVASCULAR: No chest pain   GASTROINTESTINAL: No abdominal or epigastric pain. No nausea, vomiting  GENITOURINARY: No dysuria, frequency  NEUROLOGICAL: No headaches, loss of strength, numbness, or tremors  MUSCULOSKELETAL: +  joint pain in right knee or + swelling; +  right extremity thigh pain  PSYCHIATRIC: No depression, anxiety, mood swings  HEME/LYMPH: + easy bruising      HOME MEDICATIONS:  Home Medications:  carvedilol 6.25 mg oral tablet: 1 tab(s) orally 2 times a day (11 Apr 2017 12:14)  celecoxib 200 mg oral capsule: 1 cap(s) orally 2 times a day (11 Apr 2017 12:14)  Coumadin 3 mg oral tablet: 1 tab(s) orally once a day (11 Apr 2017 12:14)  folic acid 1 mg oral tablet: 1 tab(s) orally once a day (11 Apr 2017 12:14)  furosemide 40 mg oral tablet:  orally 2 times a day (11 Apr 2017 12:14)  levETIRAcetam 750 mg oral tablet: 1 tab(s) orally 2 times a day (11 Apr 2017 12:14)  simvastatin 40 mg oral tablet: 1 tab(s) orally once a day (at bedtime) (11 Apr 2017 12:14)  Synthroid 100 mcg (0.1 mg) oral tablet: 1 tab(s) orally once a day (11 Apr 2017 12:14)          ICU Vital Signs Last 24 Hrs  T(C): 36.4 (02 May 2021 07:39), Max: 37 (01 May 2021 20:04)  T(F): 97.6 (02 May 2021 07:39), Max: 98.6 (01 May 2021 20:04)  HR: 60 (02 May 2021 09:00) (60 - 74)  BP: 91/50 (02 May 2021 09:00) (91/50 - 151/82)  BP(mean): 63 (02 May 2021 09:00) (63 - 91)  ABP: --  ABP(mean): --  RR: 17 (02 May 2021 09:00) (9 - 18)  SpO2: 99% (02 May 2021 09:00) (96% - 100%)      PHYSICAL EXAM:  GENERAL: Well-developed, elderly female who appears stated age  HEAD:  Atraumatic, normocephalic  EYES: Conjunctiva and sclera clear  NECK: Supple  NICOLAS COMA SCORE: E- V- M- = 15       E: 4= opens eyes spontaneously        V: 5= oriented       M: 6= follows commands   MENTAL STATUS: AAO x3,  Appropriately conversant without aphasia, following simple commands  CRANIAL NERVES:  EOMI without nystagmus. Facial sensation intact V1-3 distribution b/l. Face symmetric w/ normal eye closure and smile, tongue midline. [place],  Speech clear. Head turning and shoulder shrug intact.   MOTOR: strength 5/5 b/l upper and lower extremities  Uppers     Delt (C5/6)     Bicep (C5/6)          Tricep (C7)     HG (C8/T1)  R                     5/5                 5/5                 5/5                    5/5                     L                      5/5                 5/5                5/5                     5/5                     Lowers      HF(L1/L2)     KE (L3)     DF (L4)     EHL (L5)     PF (S1)         L                     5/5               5/5          5/5            4/5            5/5  Pt refusing to move right lower extremity secondary to thigh & knee pain.   SENSATION: grossly intact to light touch all extremities  HEART: +S1/+S2; Regular rate and rhythm; no murmurs, rubs, or gallops  ABDOMEN: Soft, nontender  EXTREMITIES:   no clubbing, cyanosis  SKIN: Warm, lower extremity skin color changes, multiple small wounds that are scabbed.      LABS:                                   11.3   7.05  )-----------( 115      ( 02 May 2021 04:23 )             35.7       05-02    142  |  100  |  32.0<H>  ----------------------------<  120<H>  4.3   |  24.0  |  1.00    Ca    9.2      02 May 2021 04:23  Phos  4.2     05-02  Mg     1.7     05-02    TPro  6.5<L>  /  Alb  3.6  /  TBili  0.4  /  DBili  x   /  AST  318<H>  /  ALT  101<H>  /  AlkPhos  75  05-01      CT Head No Cont (05.01.21 @ 11:39)     HEMISPHERES: There are small vessel ischemic changes in the white matter of both hemispheres. Also there is volume loss and involutional change. No acute infarct or hemorrhagic lesion is noted.  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 This has progressed and was not present on 4/11/2017. Further workup and follow-up MR imaging recommended.  EXTRACEREBRAL SPACES:  No subdural or epidural collections are noted.  SKULL BASE AND CALVARIUM:  Appears intact.  No fracture or destructive lesion is identified.  SINUSES AND MASTOIDS:  Clear.  MISCELLANEOUS:  No orbital or suprasellar abnormality noted.       CT Cervical Spine No Cont (05.01.21 @ 11:39)     There is alteration of the cervical lordosis which may reflect positioning or spasm.    C1/C2:  There is upward subluxation of migration of the odontoid into the foramen magnum. In conjunction there is atlantoaxial subluxation. Degenerative changes are noted at C1-C2. No acute fracture is suggested.    There is a chronic compression deformity of C6. No acute cervical fracture is noted.    There are chronic arthritic and spondylotic changes throughout the cervical region with central and foraminal narrowing.    Carotid arteries are calcified.          CT Lumbar Spine No Cont (05.01.21 @ 15:07)     IMPRESSION:  Focalkyphosis at T7-8. S-shaped scoliosis of the thoracolumbar spine is also noted. Multilevel degenerative disc disease and spondylosis at T1-2 through L3-4 with loss of disc height and associated degenerative endplate changes. Posterior osteophytic ridge/disc complex at T1-2 abuts the ventral cord. Disc bulges at L1-2 through L5-S1 flatten the ventral thecal sac and narrow the BILATERAL neural foramina. Moderate central stenosis at L2-3 and L4-5 and severe central stenosis at L3-4 are present secondary to disc bulge, facet osteophytic hypertrophy and redundancy of ligamentum flavum. Facet osteophytic hypertrophy also noted at L5-S1.  No acute vertebral fracture is recognized.              Assessment and Recommendation:   · Assessment	  85 y/o female with PMH significant for Aortic valve replacement, Pacemaker/AICD on coumadin, s/p fall at home today, striking the back of her neck. Denies LOC. CT C Spine shows upward subluxation of the odontoid into the foramen magnum.       1. primary care per trauma   2. Recommend MRI C Spine with thin cuts through the skull base if pacemaker/AICD compatible.   3. Cervical collar at all times, [place] J should be delivered today   4. Pain control as needed  5. Neuro checks Q2hrs.

## 2021-05-02 NOTE — PROGRESS NOTE ADULT - ATTENDING COMMENTS
I have seen and examined the patient during SICU rounds for 8-9a  events noted, AICD is NOT MRI compatible    Neuro: BENAVIDEZ, no focal on a collar  CV: HD normal  Pulm: Sat ok  GI: NPO  : Urin flow adequate, electrolytes ok  ID: no issues  Heme: h/h stable, scd for dvt chemoprophylaxis    Plan:  Fit for aspen.  Need a plan from spine since she cannot get MRI  feeds,   SC   PT eval

## 2021-05-02 NOTE — CONSULT NOTE ADULT - ATTENDING COMMENTS
Patient was seen and examined at bedside and noted to be doing well, in collar due to ? cervical spine fracture.  Telemetry: AV sequential pacing     Labs: reviewed     Based on Interrogation of device: NOT MRI COMPATIBLE     Dx: Perioperative cardiovascular assessment, rheumatic heart disease, bicuspid aortic valve s/p mechanical AVR 1993 on coumadin (noted abnormal stenosis on 4/2019 TTE- pt refused reop AVR), BiV ICD (MDT-MRI incompatible) placed after sudden death 1993 w/gen change/upgrade 2016, diastolic HFpEF (4/2019 EF 67%. Gr II DD)  - patient noted to be euvolemic with no complaints of any chest pain or shortness of breath   - patient had prior TTE in 2019 showing normal LVEF of 67 %  with Grade II diastolic dysfunction and elevated LAP >30 and mechanical prosthesis in the aortic valve position  with abnormal stenosis of the aortic prosthesis with  ms  ms AT/ET 0.30and DI : 0.26  - based on above findings on prior TTE would obtain repeat TTE to assess the mechanical prosthesis   - patient in the past has refused reop due to above findings   - unable to optimize cardiovascularly, and patient would be elevated bleeding risk as patient will require AC due to mechanical prosthesis  - INR 4.66 would not recommend giving K-Centra and continue to hold AC; goal INR for mechanical prosthesis in the aortic position is target of 2.5 (range 2.0-3.0)   - BiVAICD interrogated and NOT MRI compatible   -patient looks dry on physical exam and would hold Lasix at this time and restart when volume status is better, no IVF encourage oral hydration   - continue with antihypertensives as blood pressure can tolerate   - will follow     Karen Plummer D.O. [facility]  Cardiology/Vascular Cardiology -[facility]   Telephone # [phone]

## 2021-05-02 NOTE — CONSULT NOTE ADULT - SUBJECTIVE AND OBJECTIVE BOX
FULL NOTE TO FOLLOW                                                                      [facility]                                                               Office:  [address] [address]                                                              Telephone: [phone]. Fax:[phone]                                                                        CARDIOLOGY CONSULTATION NOTE                                                                                             Consult requested by:  Dr. Caldwell  Reason for Consultation: cardiac clearance, mechanical AVR  History obtained by: Patient and medical record  PMD: Dr. Prieto  Primary cardiologist: Dr. Gutierrez   obtained: No    COVID Status: - not detected    Chief complaint:    Patient is a 86y old  Female who presents with a chief complaint of Atlantoaxial Subluxation (02 May 2021 08:12)      HPI:  87yo female w/PMH PMH rheumatic heart disease, bicuspid aortic valve s/p mechanical AVR  on coumadin (noted abnormal stenosis on 2019 TTE-pt refused reop AVR), BiV ICD (MDT-MRI incompatible) placed after sudden death  w/gen change/upgrade , diastolic HFpEF (2019 EF 67%. Gr II DD). Patient presented to the ED s/p mechanical fall, was transferring from her walker to 's seated walker, fell back hitting head/neck on chair, denies LOC.  Now w/suspected C1, C2 fx in c-collar. Of note INR has been supratherapeutic, currently 4.1, has been holding coumadin since  per Dr. Prieto instruction.  Patient Jehova's witness and would not like to receive any blood product. c/o neck and lower back pain s/p fall. Denies exertional chest pain/pressure, palpitations, irregular and/or rapid heart beat, SOB, BURGOS, syncope/near syncope, dizziness, orthopnea, PND, cough, edema, f/c, n/v/d, hematuria, or hematochezia.       REVIEW OF SYMPTOMS:     CONSTITUTIONAL: No fever, weight loss, or fatigue  ENMT:  No difficulty hearing, tinnitus, vertigo; No sinus or throat pain  NECK: No pain or stiffness  CARDIOVASCULAR: No chest pain, dyspnea, syncope, palpitations, dizziness, Orthopnea, Paroxsymal nocturnal dyspnea  RESPIRATORY: No Dyspnea on exertion, Shortness of breath, cough, wheezing  : No dysuria, no hematuria   GI: No dark color stool, no melena, no diarrhea, no constipation, no abdominal pain   NEURO: No headache, no dizziness, no slurred speech   MUSCULOSKELETAL: as per HPI  PSYCH: No agitation, no anxiety.    ALL OTHER REVIEW OF SYSTEMS ARE NEGATIVE.      PREVIOUS DIAGNOSTIC TESTING  ECHO FINDINGS:          ALLERGIES: Allergies    iodine (Urticaria; Rash; Hives)    Intolerances          PAST MEDICAL HISTORY  No pertinent past medical history    ICD (implantable cardioverter-defibrillator) in place    Hypertension    Hyperlipidemia    Shortness of breath    Aortic valve, bicuspid    Diabetes    Cardiomyopathy    Thyroid cancer    VT (ventricular tachycardia)        PAST SURGICAL HISTORY  H/O aortic valve replacement    Controlled type 2 diabetes mellitus    H/O abdominal hysterectomy    H/O thyroidectomy        FAMILY HISTORY:  No pertinent family history in first degree relatives        SOCIAL HISTORY:    CIGARETTES: Denies  ALCOHOL: Denies  DRUGS: Denies      CURRENT MEDICATIONS:  carvedilol Oral Tab/Cap - Peds 6.25 milliGRAM(s) Oral two times a day     levETIRAcetam  dextrose 40% Gel  dextrose 50% Injectable  dextrose 50% Injectable  dextrose 50% Injectable  glucagon  Injectable  insulin lispro (ADMELOG) corrective regimen sliding scale  levothyroxine  multiple electrolytes Injection Type 1  simvastatin        HOME MEDICATIONS:  Allopurinol 300 MG Oral Tablet; TAKE 1 TABLET DAILY AS DIRECTED  Matthew-Mag-Zinc-D TABS; TAKE 1 TABLET DAILY  Carvedilol 6.25 MG Oral Tablet; TAKE 1 TABLET TWICE DAILY WITH MEALS  Celecoxib 200 MG Oral Capsule; TAKE 2 CAPSULES ONCE DAILY  Ciprofloxacin HCl - 500 MG Oral Tablet  Coumadin 3 MG Oral Tablet  Folic Acid 1 MG Oral Tablet; TAKE 1 TABLET DAILY  Furosemide 20 MG Oral Tablet; Take 1 tablet twice daily  levETIRAcetam 750 MG Oral Tablet; TAKE 1 TABLET TWICE DAILY  Levothyroxine Sodium 112 MCG Oral Tablet; TAKE 1 TABLET DAILY  Methenamine Hippurate 1 GM Oral Tablet; TAKE 1 TABLET DAILY IN THE EVENING  Nystatin 793584 UNIT/GM External Powder; APPLY 2-3 TIMES DAILY TO AFFECTED  AREA(S)  Simvastatin 40 MG Oral Tablet; TAKE 1 TABLET AT BEDTIME  Tamsulosin HCl - 0.4 MG Oral Capsule; ONE CAPSULE TWICE DAILY    Vital Signs Last 24 Hrs  T(C): 36.4 (02 May 2021 07:39), Max: 37 (01 May 2021 20:04)  T(F): 97.6 (02 May 2021 07:39), Max: 98.6 (01 May 2021 20:04)  HR: 60 (02 May 2021 07:00) (60 - 74)  BP: 101/48 (02 May 2021 07:00) (101/48 - 151/82)  BP(mean): 64 (02 May 2021 07:00) (64 - 91)  RR: 15 (02 May 2021 07:00) (9 - 18)  SpO2: 98% (02 May 2021 07:00) (96% - 100%)      PHYSICAL EXAM:  Appearance: NAD, well nourished	  Neurologic: A&Ox3, PERRL, EOMI, Grossly non-focal with full strength in all four extremities  HEENT:   Normal oral mucosa, sclera non-icteric	  Lymphatic: No cervical lymphadenopathy  Cardiovascular: Normal S1 S2, No JVD, No murmur, No carotid bruits  Respiratory: Lungs clear to auscultation	  Psychiatry: Mood & affect appropriate  Gastrointestinal:  Soft, Non-tender, + BS, no bruits	  Extremities: Normal range of motion, No clubbing, cyanosis or edema  Vascular: Peripheral pulses palpable 2+ bilaterally  Skin: No Erythema, No ecchymoses, No cyanosis, No rashes  Lines and Tubes: n/a    Intake and output:    @ 07:01  -  -02 @ 07:00  --------------------------------------------------------  IN: 625 mL / OUT: 350 mL / NET: 275 mL        LABS:                        11.3   7.05  )-----------( 115      ( 02 May 2021 04:23 )             35.7     05-02    142  |  100  |  32.0<H>  ----------------------------<  120<H>  4.3   |  24.0  |  1.00    Ca    9.2      02 May 2021 04:23  Phos  4.2     05-02  Mg     1.7     05-02    TPro  6.5<L>  /  Alb  3.6  /  TBili  0.4  /  DBili  x   /  AST  318<H>  /  ALT  101<H>  /  AlkPhos  75  05-01      ;p-BNP=  PT/INR - ( 02 May 2021 04:23 )   PT: 50.2 sec;   INR: 4.66 ratio         PTT - ( 02 May 2021 04:23 )  PTT:39.0 sec  Urinalysis Basic - ( 01 May 2021 19:44 )    Color: Yellow / Appearance: Slightly Turbid / S.010 / pH: x  Gluc: x / Ketone: Trace  / Bili: Negative / Urobili: Negative   Blood: x / Protein: 30 mg/dL / Nitrite: Positive   Leuk Esterase: Moderate / RBC: 25-50 /HPF / WBC 26-50   Sq Epi: x / Non Sq Epi: Few / Bacteria: Moderate        INTERPRETATION OF TELEMETRY: AV paced, 60bpm, 4beat VT @0420  ECG: ordered    RADIOLOGY & ADDITIONAL STUDIES:    X-ray:  < from: Xray Chest 1 View- PORTABLE-Urgent (Xray Chest 1 View- PORTABLE-Urgent .) (21 @ 15:12) >  IMPRESSION: No gross consolidation is seen. Status post sternotomy. Pacemaker present.    < end of copied text >    CT scan:   < from: CT Angio Neck w/ IV Cont (21 @ 02:47) >  IMPRESSION:    CTA Neck: No CT evidence of acute traumatic vascular injury involving the cervical carotid or vertebral arteries.    Right level 2 lymphadenopathy, just deep to the sternocleidomastoid muscle, which demonstrates areas of central hypoattenuation, which may reflect necrosis. Status post thyroidectomy. In the setting of prior malignancy, findings would be concerning for jd metastasis. Direct tissue sampling may be obtained as clinically warranted.    < end of copied text >    < from: CT Cervical Spine No Cont (21 @ 11:39) >  FINDINGS:    There is alteration of the cervical lordosis which may reflect positioning or spasm.    C1/C2:  There is upward subluxation of migration of the odontoid into the foramen magnum. In conjunction there is atlantoaxial subluxation. Degenerative changes are noted at C1-C2. No acute fracture is suggested.    There is a chronic compression deformity of C6. No acute cervical fracture is noted.    There are chronic arthritic and spondylotic changes throughout the cervical region with central and foraminal narrowing.    Carotid arteries are calcified.    IMPRESSION:      1. No acute fracture identified.  2. 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 This wasnot present on 2017. Cervical MR imaging recommended for further assessment.  3. Advanced degenerative changes throughout the cervical region.    < end of copied text >    MRI:                                                                          [place] [facility]                                                               Office:  [address] [address]                                                              Telephone: [phone]. Fax:[phone]                                                                        CARDIOLOGY CONSULTATION NOTE                                                                                             Consult requested by:  Dr. Caldwell  Reason for Consultation: cardiac clearance, mechanical AVR  History obtained by: Patient and medical record  PMD: Dr. Prieto  Primary cardiologist: Dr. Gutierrez   obtained: No    COVID Status: - not detected    Chief complaint:    Patient is a 86y old  Female who presents with a chief complaint of Atlantoaxial Subluxation (02 May 2021 08:12)      HPI:  85yo female w/PMH PMH rheumatic heart disease, bicuspid aortic valve s/p mechanical AVR  on coumadin (noted abnormal stenosis on 2019 TTE-pt refused reop AVR), BiV ICD (MDT-MRI incompatible) placed after sudden death  w/gen change/upgrade , diastolic HFpEF (2019 EF 67%. Gr II DD). Patient presented to the ED s/p mechanical fall, was transferring from her walker to 's seated walker, fell back hitting head/neck on chair, denies LOC.  Now w/suspected C1, C2 fx in c-collar. Of note INR has been supratherapeutic, currently 4.1, has been holding coumadin since  per Dr. Prieto instruction.  Patient Jehova's witness and would not like to receive any blood product. c/o neck and lower back pain s/p fall. Denies exertional chest pain/pressure, palpitations, irregular and/or rapid heart beat, SOB, BURGOS, syncope/near syncope, dizziness, orthopnea, PND, cough, edema, f/c, n/v/d, hematuria, or hematochezia.       REVIEW OF SYMPTOMS:     CONSTITUTIONAL: No fever, weight loss, or fatigue  ENMT:  No difficulty hearing, tinnitus, vertigo; No sinus or throat pain  NECK: No pain or stiffness  CARDIOVASCULAR: No chest pain, dyspnea, syncope, palpitations, dizziness, Orthopnea, Paroxsymal nocturnal dyspnea  RESPIRATORY: No Dyspnea on exertion, Shortness of breath, cough, wheezing  : No dysuria, no hematuria   GI: No dark color stool, no melena, no diarrhea, no constipation, no abdominal pain   NEURO: No headache, no dizziness, no slurred speech   MUSCULOSKELETAL: as per HPI  PSYCH: No agitation, no anxiety.    ALL OTHER REVIEW OF SYSTEMS ARE NEGATIVE.      PREVIOUS DIAGNOSTIC TESTING  ECHO FINDINGS:           ALLERGIES: Allergies    iodine (Urticaria; Rash; Hives)    Intolerances          PAST MEDICAL HISTORY  No pertinent past medical history    ICD (implantable cardioverter-defibrillator) in place    Hypertension    Hyperlipidemia    Shortness of breath    Aortic valve, bicuspid    Diabetes    Cardiomyopathy    Thyroid cancer    VT (ventricular tachycardia)        PAST SURGICAL HISTORY  H/O aortic valve replacement    Controlled type 2 diabetes mellitus    H/O abdominal hysterectomy    H/O thyroidectomy        FAMILY HISTORY:  No pertinent family history in first degree relatives        SOCIAL HISTORY:    CIGARETTES: Denies  ALCOHOL: Denies  DRUGS: Denies      CURRENT MEDICATIONS:  carvedilol Oral Tab/Cap - Peds 6.25 milliGRAM(s) Oral two times a day     levETIRAcetam  dextrose 40% Gel  dextrose 50% Injectable  dextrose 50% Injectable  dextrose 50% Injectable  glucagon  Injectable  insulin lispro (ADMELOG) corrective regimen sliding scale  levothyroxine  multiple electrolytes Injection Type 1  simvastatin        HOME MEDICATIONS:  Allopurinol 300 MG Oral Tablet; TAKE 1 TABLET DAILY AS DIRECTED  Matthew-Mag-Zinc-D TABS; TAKE 1 TABLET DAILY  Carvedilol 6.25 MG Oral Tablet; TAKE 1 TABLET TWICE DAILY WITH MEALS  Celecoxib 200 MG Oral Capsule; TAKE 2 CAPSULES ONCE DAILY  Ciprofloxacin HCl - 500 MG Oral Tablet  Coumadin 3 MG Oral Tablet  Folic Acid 1 MG Oral Tablet; TAKE 1 TABLET DAILY  Furosemide 20 MG Oral Tablet; Take 1 tablet twice daily  levETIRAcetam 750 MG Oral Tablet; TAKE 1 TABLET TWICE DAILY  Levothyroxine Sodium 112 MCG Oral Tablet; TAKE 1 TABLET DAILY  Methenamine Hippurate 1 GM Oral Tablet; TAKE 1 TABLET DAILY IN THE EVENING  Nystatin 151238 UNIT/GM External Powder; APPLY 2-3 TIMES DAILY TO AFFECTED  AREA(S)  Simvastatin 40 MG Oral Tablet; TAKE 1 TABLET AT BEDTIME  Tamsulosin HCl - 0.4 MG Oral Capsule; ONE CAPSULE TWICE DAILY    Vital Signs Last 24 Hrs  T(C): 36.4 (02 May 2021 07:39), Max: 37 (01 May 2021 20:04)  T(F): 97.6 (02 May 2021 07:39), Max: 98.6 (01 May 2021 20:04)  HR: 60 (02 May 2021 07:00) (60 - 74)  BP: 101/48 (02 May 2021 07:00) (101/48 - 151/82)  BP(mean): 64 (02 May 2021 07:00) (64 - 91)  RR: 15 (02 May 2021 07:00) (9 - 18)  SpO2: 98% (02 May 2021 07:00) (96% - 100%)      PHYSICAL EXAM:  Appearance: NAD, well nourished	  Neurologic: A&Ox3, PERRL, EOMI, Grossly non-focal with full strength in all four extremities  HEENT:   Normal oral mucosa, sclera non-icteric	  Lymphatic: No cervical lymphadenopathy  Cardiovascular: Normal S1 S2, No JVD, No murmur, No carotid bruits  Respiratory: Lungs clear to auscultation	  Psychiatry: Mood & affect appropriate  Gastrointestinal:  Soft, Non-tender, + BS, no bruits	  Extremities: Normal range of motion, No clubbing, cyanosis or edema  Vascular: Peripheral pulses palpable 2+ bilaterally  Skin: No Erythema, No ecchymoses, No cyanosis, No rashes  Lines and Tubes: n/a    Intake and output:    @ 07:01  -  -02 @ 07:00  --------------------------------------------------------  IN: 625 mL / OUT: 350 mL / NET: 275 mL        LABS:                        11.3   7.05  )-----------( 115      ( 02 May 2021 04:23 )             35.7     05-02    142  |  100  |  32.0<H>  ----------------------------<  120<H>  4.3   |  24.0  |  1.00    Ca    9.2      02 May 2021 04:23  Phos  4.2     05-02  Mg     1.7     05-02    TPro  6.5<L>  /  Alb  3.6  /  TBili  0.4  /  DBili  x   /  AST  318<H>  /  ALT  101<H>  /  AlkPhos  75  05-01      ;p-BNP=  PT/INR - ( 02 May 2021 04:23 )   PT: 50.2 sec;   INR: 4.66 ratio         PTT - ( 02 May 2021 04:23 )  PTT:39.0 sec  Urinalysis Basic - ( 01 May 2021 19:44 )    Color: Yellow / Appearance: Slightly Turbid / S.010 / pH: x  Gluc: x / Ketone: Trace  / Bili: Negative / Urobili: Negative   Blood: x / Protein: 30 mg/dL / Nitrite: Positive   Leuk Esterase: Moderate / RBC: 25-50 /HPF / WBC 26-50   Sq Epi: x / Non Sq Epi: Few / Bacteria: Moderate        INTERPRETATION OF TELEMETRY: AV paced, 60bpm, 4beat VT @0420  ECG: ordered    RADIOLOGY & ADDITIONAL STUDIES:    X-ray:  < from: Xray Chest 1 View- PORTABLE-Urgent (Xray Chest 1 View- PORTABLE-Urgent .) (21 @ 15:12) >  IMPRESSION: No gross consolidation is seen. Status post sternotomy. Pacemaker present.    < end of copied text >    CT scan:   < from: CT Angio Neck w/ IV Cont (21 @ 02:47) >  IMPRESSION:    CTA Neck: No CT evidence of acute traumatic vascular injury involving the cervical carotid or vertebral arteries.    Right level 2 lymphadenopathy, just deep to the sternocleidomastoid muscle, which demonstrates areas of central hypoattenuation, which may reflect necrosis. Status post thyroidectomy. In the setting of prior malignancy, findings would be concerning for jd metastasis. Direct tissue sampling may be obtained as clinically warranted.    < end of copied text >    < from: CT Cervical Spine No Cont (21 @ 11:39) >  FINDINGS:    There is alteration of the cervical lordosis which may reflect positioning or spasm.    C1/C2:  There is upward subluxation of migration of the odontoid into the foramen magnum. In conjunction there is atlantoaxial subluxation. Degenerative changes are noted at C1-C2. No acute fracture is suggested.    There is a chronic compression deformity of C6. No acute cervical fracture is noted.    There are chronic arthritic and spondylotic changes throughout the cervical region with central and foraminal narrowing.    Carotid arteries are calcified.    IMPRESSION:      1. No acute fracture identified.  2. 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 This wasnot present on 2017. Cervical MR imaging recommended for further assessment.  3. Advanced degenerative changes throughout the cervical region.    < end of copied text >    MRI:

## 2021-05-02 NOTE — PROGRESS NOTE ADULT - ATTENDING COMMENTS
NSGY Attg:    see above    patient seen and examined    mild neck pain  conversant  BENAVIDEZ to command symmetrically  no focal motor deficit    CT c spine without acute fracture  basilar invagination  no CT findings consistent with acute instability -- O-C1 distance maintained; lateral masses of C1/2 align properly    agree with plan as above  continue cervical collar for now  continue ICU care with frequent neuro checks given MRI cannot be obtained to assess for EDH  will likely need flexion-extension x-rays as outpatient

## 2021-05-03 LAB
ANION GAP SERPL CALC-SCNC: 11 MMOL/L — SIGNIFICANT CHANGE UP (ref 5–17)
APTT BLD: 39.8 SEC — HIGH (ref 27.5–35.5)
BASOPHILS # BLD AUTO: 0.03 K/UL — SIGNIFICANT CHANGE UP (ref 0–0.2)
BASOPHILS NFR BLD AUTO: 0.4 % — SIGNIFICANT CHANGE UP (ref 0–2)
BUN SERPL-MCNC: 52 MG/DL — HIGH (ref 8–20)
CALCIUM SERPL-MCNC: 8.4 MG/DL — LOW (ref 8.6–10.2)
CHLORIDE SERPL-SCNC: 99 MMOL/L — SIGNIFICANT CHANGE UP (ref 98–107)
CO2 SERPL-SCNC: 29 MMOL/L — SIGNIFICANT CHANGE UP (ref 22–29)
CREAT SERPL-MCNC: 1.37 MG/DL — HIGH (ref 0.5–1.3)
CULTURE RESULTS: SIGNIFICANT CHANGE UP
EOSINOPHIL # BLD AUTO: 0.19 K/UL — SIGNIFICANT CHANGE UP (ref 0–0.5)
EOSINOPHIL NFR BLD AUTO: 2.6 % — SIGNIFICANT CHANGE UP (ref 0–6)
GLUCOSE BLDC GLUCOMTR-MCNC: 150 MG/DL — HIGH (ref 70–99)
GLUCOSE BLDC GLUCOMTR-MCNC: 268 MG/DL — HIGH (ref 70–99)
GLUCOSE SERPL-MCNC: 92 MG/DL — SIGNIFICANT CHANGE UP (ref 70–99)
HCT VFR BLD CALC: 31.4 % — LOW (ref 34.5–45)
HGB BLD-MCNC: 10.2 G/DL — LOW (ref 11.5–15.5)
IMM GRANULOCYTES NFR BLD AUTO: 0.6 % — SIGNIFICANT CHANGE UP (ref 0–1.5)
INR BLD: 5.92 RATIO — CRITICAL HIGH (ref 0.88–1.16)
LYMPHOCYTES # BLD AUTO: 1.08 K/UL — SIGNIFICANT CHANGE UP (ref 1–3.3)
LYMPHOCYTES # BLD AUTO: 15.1 % — SIGNIFICANT CHANGE UP (ref 13–44)
MAGNESIUM SERPL-MCNC: 1.7 MG/DL — SIGNIFICANT CHANGE UP (ref 1.6–2.6)
MCHC RBC-ENTMCNC: 30.4 PG — SIGNIFICANT CHANGE UP (ref 27–34)
MCHC RBC-ENTMCNC: 32.5 GM/DL — SIGNIFICANT CHANGE UP (ref 32–36)
MCV RBC AUTO: 93.5 FL — SIGNIFICANT CHANGE UP (ref 80–100)
MONOCYTES # BLD AUTO: 0.72 K/UL — SIGNIFICANT CHANGE UP (ref 0–0.9)
MONOCYTES NFR BLD AUTO: 10 % — SIGNIFICANT CHANGE UP (ref 2–14)
NEUTROPHILS # BLD AUTO: 5.11 K/UL — SIGNIFICANT CHANGE UP (ref 1.8–7.4)
NEUTROPHILS NFR BLD AUTO: 71.3 % — SIGNIFICANT CHANGE UP (ref 43–77)
PHOSPHATE SERPL-MCNC: 2.8 MG/DL — SIGNIFICANT CHANGE UP (ref 2.4–4.7)
PLATELET # BLD AUTO: 102 K/UL — LOW (ref 150–400)
POTASSIUM SERPL-MCNC: 4.2 MMOL/L — SIGNIFICANT CHANGE UP (ref 3.5–5.3)
POTASSIUM SERPL-SCNC: 4.2 MMOL/L — SIGNIFICANT CHANGE UP (ref 3.5–5.3)
PROTHROM AB SERPL-ACNC: 63.1 SEC — HIGH (ref 10.6–13.6)
RBC # BLD: 3.36 M/UL — LOW (ref 3.8–5.2)
RBC # FLD: 13.4 % — SIGNIFICANT CHANGE UP (ref 10.3–14.5)
SODIUM SERPL-SCNC: 139 MMOL/L — SIGNIFICANT CHANGE UP (ref 135–145)
SPECIMEN SOURCE: SIGNIFICANT CHANGE UP
WBC # BLD: 7.17 K/UL — SIGNIFICANT CHANGE UP (ref 3.8–10.5)
WBC # FLD AUTO: 7.17 K/UL — SIGNIFICANT CHANGE UP (ref 3.8–10.5)

## 2021-05-03 PROCEDURE — 99232 SBSQ HOSP IP/OBS MODERATE 35: CPT

## 2021-05-03 PROCEDURE — 99223 1ST HOSP IP/OBS HIGH 75: CPT

## 2021-05-03 RX ORDER — ACETAMINOPHEN 500 MG
650 TABLET ORAL EVERY 6 HOURS
Refills: 0 | Status: DISCONTINUED | OUTPATIENT
Start: 2021-05-03 | End: 2021-05-06

## 2021-05-03 RX ORDER — LEVOTHYROXINE SODIUM 125 MCG
1 TABLET ORAL
Qty: 0 | Refills: 0 | DISCHARGE

## 2021-05-03 RX ORDER — METFORMIN HYDROCHLORIDE 850 MG/1
1 TABLET ORAL
Qty: 0 | Refills: 0 | DISCHARGE

## 2021-05-03 RX ORDER — MAGNESIUM SULFATE 500 MG/ML
2 VIAL (ML) INJECTION ONCE
Refills: 0 | Status: COMPLETED | OUTPATIENT
Start: 2021-05-03 | End: 2021-05-03

## 2021-05-03 RX ORDER — POTASSIUM CHLORIDE 20 MEQ
1 PACKET (EA) ORAL
Qty: 0 | Refills: 0 | DISCHARGE

## 2021-05-03 RX ORDER — ERGOCALCIFEROL 1.25 MG/1
1 CAPSULE ORAL
Qty: 0 | Refills: 0 | DISCHARGE

## 2021-05-03 RX ORDER — FUROSEMIDE 40 MG
1 TABLET ORAL
Qty: 0 | Refills: 0 | DISCHARGE

## 2021-05-03 RX ORDER — SODIUM CHLORIDE 9 MG/ML
1000 INJECTION, SOLUTION INTRAVENOUS
Refills: 0 | Status: DISCONTINUED | OUTPATIENT
Start: 2021-05-03 | End: 2021-05-05

## 2021-05-03 RX ORDER — ACETAMINOPHEN 500 MG
1000 TABLET ORAL ONCE
Refills: 0 | Status: COMPLETED | OUTPATIENT
Start: 2021-05-03 | End: 2021-05-03

## 2021-05-03 RX ADMIN — Medication 6: at 21:50

## 2021-05-03 RX ADMIN — Medication 400 MILLIGRAM(S): at 11:20

## 2021-05-03 RX ADMIN — CARVEDILOL PHOSPHATE 6.25 MILLIGRAM(S): 80 CAPSULE, EXTENDED RELEASE ORAL at 17:53

## 2021-05-03 RX ADMIN — Medication 62.5 MILLIMOLE(S): at 11:19

## 2021-05-03 RX ADMIN — Medication 50 GRAM(S): at 08:27

## 2021-05-03 RX ADMIN — SODIUM CHLORIDE 75 MILLILITER(S): 9 INJECTION, SOLUTION INTRAVENOUS at 08:27

## 2021-05-03 RX ADMIN — Medication 112 MICROGRAM(S): at 05:20

## 2021-05-03 RX ADMIN — Medication 3 MILLIGRAM(S): at 21:50

## 2021-05-03 RX ADMIN — LEVETIRACETAM 750 MILLIGRAM(S): 250 TABLET, FILM COATED ORAL at 05:20

## 2021-05-03 RX ADMIN — SIMVASTATIN 40 MILLIGRAM(S): 20 TABLET, FILM COATED ORAL at 21:50

## 2021-05-03 RX ADMIN — LEVETIRACETAM 750 MILLIGRAM(S): 250 TABLET, FILM COATED ORAL at 17:53

## 2021-05-03 RX ADMIN — CARVEDILOL PHOSPHATE 6.25 MILLIGRAM(S): 80 CAPSULE, EXTENDED RELEASE ORAL at 05:20

## 2021-05-03 NOTE — PHYSICAL THERAPY INITIAL EVALUATION ADULT - RANGE OF MOTION EXAMINATION, REHAB EVAL
Bilateral shoulder flexion and RLE knee flexion/extension limited 2*2 h/o OA, bursitis and pain s/p fall.

## 2021-05-03 NOTE — CONSULT NOTE ADULT - TIME BILLING
D/W patient, , RN, SHERRON Cuenca, attending Dr. Charles
Perioperative cardiovascular assessment, rheumatic heart disease, bicuspid aortic valve s/p mechanical AVR 1993 on coumadin (noted abnormal stenosis on 4/2019 TTE- pt refused reop AVR), BiV ICD (MDT-MRI incompatible) placed after sudden death 1993 w/gen change/upgrade 2016, diastolic HFpEF (4/2019 EF 67%. Gr II DD)

## 2021-05-03 NOTE — DIETITIAN INITIAL EVALUATION ADULT. - ETIOLOGY
Related inability to meet sufficient protein energy requirements in setting of advanced age, wound healing and new atlanto-occipital subluxation

## 2021-05-03 NOTE — CONSULT NOTE ADULT - SUBJECTIVE AND OBJECTIVE BOX
[facility] PALLIATIVE MEDICINE CONSULT    CC: Patient is a 86y old  Female who presents with a chief complaint of Atlantoaxial Subluxation (03 May 2021 11:44)    HPI:  Mrs. New is a 86 year old female [employer] with PMHx of arthritis, DM, HTN, cardiomyopathy s/p AICD, bicuspid AV s/p AVR () on coumadin presented s/p mechanical fall at home. ROS +trauma to head with no LOC, +head/neck pain, +lower back pain. At baseline pt ambulates with walker and requires assist with most ADLs. Radiologic workup significant for atlantoaxial subluxation and supratherapeutic INR (pt was advised to hold coumadin 2 days prior to admission). Admitted to SICU for closer management. Evaluated by Neurosurgery, plans for surgical interventions and to maintain with cervical collar. GOC initiated by SICU team, pt wishes to continue all medical interventions, maintain full code and possible need for rehab on discharge.     Pt seen and examined earlier today.  August at bedside.     Present Symptoms:   Dyspnea:  No   Nausea/Vomiting:  No  Anxiety:   No  Depression:  No  Fatigue: Yes   Loss of appetite: Yes   Pain: intermittent at legs and neck          Review of Systems: As per HPI.  All others negative     PERTINENT PMH REVIEWED: Yes     PAST MEDICAL & SURGICAL HISTORY:  ICD (implantable cardioverter-defibrillator) in place    Hypertension    Hyperlipidemia    Shortness of breath    Aortic valve, bicuspid    Diabetes  Diet controlled    Cardiomyopathy    Thyroid cancer    VT (ventricular tachycardia)    H/O aortic valve replacement    H/O abdominal hysterectomy    H/O thyroidectomy        SOCIAL HISTORY:    Admitted from:  home   - lives with   - children: 2 son (1 lives in [place], 1 lives in [place])    Baseline ADLs (prior to admission)  [place]: []Total  [] Moderate [x]Dependent    Palliative Performance Status Version 2: 30%  http://npcrc.org/files/news/palliative_performance_scale_[email or web address].pdf      ADVANCE DIRECTIVES:   DNR YES NO  Completed on:                     MOLST  YES NO   Completed on:  Living Will  YES NO   Completed on:    DECISION MAKER(s):  [] Health Care Proxy(s)  [x] Surrogate(s)  [] Guardian             Name: Leon New ()  Phone Number: [phone]      FAMILY HISTORY:  No pertinent family history in first degree relatives         Allergies    iodine (Urticaria; Rash; Hives)    Intolerances        MEDICATIONS  (STANDING):  carvedilol 6.25 milliGRAM(s) Oral every 12 hours  insulin lispro (ADMELOG) corrective regimen sliding scale   SubCutaneous Before meals and at bedtime  levETIRAcetam 750 milliGRAM(s) Oral two times a day  levothyroxine 112 MICROGram(s) Oral daily  melatonin 3 milliGRAM(s) Oral at bedtime  multiple electrolytes Injection Type 1 1000 milliLiter(s) (75 mL/Hr) IV Continuous <Continuous>  simvastatin 40 milliGRAM(s) Oral at bedtime    MEDICATIONS  (PRN):      PHYSICAL EXAM:    Vital Signs Last 24 Hrs  T(C): 36.7 (03 May 2021 12:00), Max: 36.9 (02 May 2021 15:31)  T(F): 98 (03 May 2021 12:00), Max: 98.4 (02 May 2021 15:31)  HR: 60 (03 May 2021 14:00) (60 - 73)  BP: 114/49 (03 May 2021 14:00) (91/77 - 120/66)  BP(mean): 68 (03 May 2021 14:00) (56 - 106)  RR: 14 (03 May 2021 14:00) (12 - 20)  SpO2: 96% (03 May 2021 14:00) (93% - 100%)    General: frail. cachetic. Resting comfortably. No acute distress.   HEENT:  mucous membrane dry   Lungs: comfortable. non-labored.   CV: +s1/s2. RRR     GI:+ bowel sound. abdomen soft, NT ND  MSK: Moves all 4 extremities.  No cyanosis. + edema. weakness.   Neuro: nonfocal. Awake and alert, orientedx3. Interactive.   Skin: warm and dry. per staff, nonblanchable erythema on buttock and thighs     LABS:                        10.2   7.17  )-----------( 102      ( 03 May 2021 05:49 )             31.4     05-03    139  |  99  |  52.0<H>  ----------------------------<  92  4.2   |  29.0  |  1.37<H>    Ca    8.4<L>      03 May 2021 05:50  Phos  2.8     05-03  Mg     1.7     05-03    TPro  6.5<L>  /  Alb  3.6  /  TBili  0.4  /  DBili  x   /  AST  318<H>  /  ALT  101<H>  /  AlkPhos  75  05-01    PT/INR - ( 03 May 2021 05:50 )   PT: 63.1 sec;   INR: 5.92 ratio         PTT - ( 03 May 2021 05:50 )  PTT:39.8 sec  Urinalysis Basic - ( 01 May 2021 19:44 )    Color: Yellow / Appearance: Slightly Turbid / S.010 / pH: x  Gluc: x / Ketone: Trace  / Bili: Negative / Urobili: Negative   Blood: x / Protein: 30 mg/dL / Nitrite: Positive   Leuk Esterase: Moderate / RBC: 25-50 /HPF / WBC 26-50   Sq Epi: x / Non Sq Epi: Few / Bacteria: Moderate        RADIOLOGY & ADDITIONAL STUDIES:    CXR 2021  FINDINGS:  No previous examinations are available for review.    The lungs are clear of infiltrate or effusion.    The heart and mediastinum appear intact.  Patient is status post median sternotomy. Pacemaker is noted. Surgical clips are seen overlying the soft tissues of the right neck and upper tracheal region.    IMPRESSION: No gross consolidation is seen. Status post sternotomy. Pacemaker present.    Right Tibular and Fibular XR 2021    FINDINGS:  No prior studies are available for review.    The tibia and fibula appear intact.   No fracture is seen.   No radiopaque foreign body is seen. Soft tissue phleboliths are noted.  Diffuse osteopenia of the visualized osseous structures is noted.    IMPRESSION :   Limited study. Osteopenia. No gross fracture is seen.    Right Knee XR 2021  FINDINGS:  No prior exams are available for comparison.    No gross fracture or dislocation is seen. Tricompartmental degenerative changes are seen in the right knee. No radiographic joint effusion is seen. Diffuse osteopenia of the visualized osseous structures is noted.        IMPRESSION:   Osteopenia. No gross fracture or dislocation is seen. Tricompartmental degenerative changes noted.    CT C-Spine 2021  FINDINGS:    There is alteration of the cervical lordosis which may reflect positioning or spasm.    C1/C2:  There is upward subluxation of migration of the odontoid into the foramen magnum. In conjunction there is atlantoaxial subluxation. Degenerative changes are noted at C1-C2. No acute fracture is suggested.    There is a chronic compression deformity of C6. No acute cervical fracture is noted.    There are chronic arthritic and spondylotic changes throughout the cervical region with central and foraminal narrowing.    Carotid arteries are calcified.    IMPRESSION:  1. No acute fracture identified.  2. 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 This was not present on 2017. Cervical MR imaging recommended for further assessment.  3. Advanced degenerative changes throughout the cervical region.    CT Head 2021  FINDINGS:  HEMISPHERES:There are small vessel ischemic changes in the white matter of both hemispheres. Also there is volume loss and involutional change. No acute infarct or hemorrhagic lesion is noted.  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 This has progressed and was not present on 2017. Further workup and follow-up MR imaging recommended.  EXTRACEREBRAL SPACES:  No subdural or epidural collections are noted.  SKULL BASE AND CALVARIUM:  Appears intact.  No fracture or destructive lesion is identified.  SINUSES AND MASTOIDS:  Clear.  MISCELLANEOUS:  No orbital or suprasellar abnormality noted.    IMPRESSION:  1)  scattered chronic ischemic changes with volume loss. Incidentally noted is upward migration of the odontoid with basilar invagination..  2)  no intracerebral hemorrhage, contusion, or extracerebral collections are identified.    Follow-up MR imaging of the cervical spine is recommended along with further clinical correlation    CT Pelvis 2021  FINDINGS:    BONE: No acute fracture. No dislocation. Incompletely evaluated multilevel degenerative change of the lumbar spine. Degeneration the pubic symphysis with chondrocalcinosis. Degeneration of the bilateral SI joints. Severe right hip cartilage space narrowing and marginal osteophyte formation.    SOFT TISSUE: Vascular calcifications. Vessels are otherwise normal in course and caliber. Diverticular disease of the sigmoid colon without evidence of acute diverticulitis. No pelvic free fluid or lymphadenopathy. Diffuse muscle atrophy. No focal hematoma or other fluid collection.    IMPRESSION:  No acute fracture or dislocation.    CT Thoracic and Lumbar Spine 2021  FINDINGS:   No prior similar studies are available for review    Thoracic and lumbar vertebral body heights are maintained. No acute vertebral fracture is seen. No destructive bone lesion is found.  Alignment is significant for focal kyphosis at T7-8. S-shaped scoliosis of the thoracolumbar spine is also noted.    Thoracic and lumbar intervertebral disc spaces show multilevel degenerative disc disease and spondylosis at T1-2 through L3-4 with loss of disc height and associated degenerative endplate changes. Posterior osteophytic ridge/disc complex at T1-2 abuts the ventral cord. Disc bulges at L1-2 through L5-S1 flatten the ventral thecal sac and narrow the BILATERAL neural foramina. Moderate central stenosis at L2-3 and L4-5 and severe central stenosis at L3-4 are present secondary to disc bulge, facet osteophytic hypertrophy and redundancy of ligamentum flavum. Facet osteophytic hypertrophy also noted at L5-S1. Degenerative changes of the sacroiliac joints.    No paraspinal mass is recognized.  Paraspinal soft tissues appear intact.    3 cm hepatic cyst in the RIGHT lobe. 2 subcentimeter LEFT renal cyst. Calcification in the room BILATERAL renal sri.      IMPRESSION:  Focal kyphosis at T7-8. S-shaped scoliosis of the thoracolumbar spine is also noted. Multilevel degenerative disc disease and spondylosis at T1-2 through L3-4 with loss of disc height and associated degenerative endplate changes. Posterior osteophytic ridge/disc complex at T1-2 abuts the ventral cord. Disc bulges at L1-2 through L5-S1 flatten the ventral thecal sac and narrow the BILATERAL neural foramina. Moderate central stenosis at L2-3 and L4-5 and severe central stenosis at L3-4 are present secondary to disc bulge, facet osteophytic hypertrophy and redundancy of ligamentum flavum. Facet osteophytic hypertrophy also noted at L5-S1.  No acute vertebral fracture is recognized.    CTA Neck 2021  FINDINGS:    CT ANGIOGRAPHY NECK:    Thoracic aorta and branch vessels: Patent. Atherosclerosis.  No flow-limiting stenosis.  No evidence of dissection.    Right carotid system: Patent. Scattered calcified atherosclerosis.  No hemodynamically significant stenosis using NASCET criteria.  No evidence of dissection.    Left carotid system: Patent. Scattered calcified atherosclerosis.  No hemodynamically significant stenosis using NASCET criteria.  No evidence of dissection.    Vertebral arteries: Patent.  No atherosclerosis.  No flow-limiting stenosis. No evidence of dissection.    Soft tissues of the neck: 2.1 x 1.5 cm level II node, just deep to the sternocleidomastoid muscle with area of central hypoattenuation, which may necrosis. Surgical clips within the right lateral and lower midline neck. Status post thyroidectomy.    Visualized spine: Multilevel degenerative changes spine. Redemonstration of basilar invagination with upward migration of the odontoid process. Correlate with CT cervical spine performed on 2021.    Visualized upper chest: Visualized lung apices are clear. Partially imaged left anterior chest wall cardiac device and leads. Status post median sternotomy.    CT ANGIOGRAPHY BRAIN:    Internal carotid arteries: Patent bilaterally. No flow limiting stenosis.    Anterior cerebral arteries: Patent bilaterally without flow limiting stenosis. Hypoplastic right A1 segment.    Middle cerebral arteries: Patent bilaterally without flow limiting  stenosis.    Anterior communicating artery: Visualized.    Posterior communicating arteries: Visualized bilaterally.    Posterior cerebral arteries: Patent bilaterally without stenosis.    Vertebrobasilar: Patent without stenosis. The distal vertebral arteries are similar in caliber.  Bilateral posterior inferior cerebellar arteries and bilateral superior cerebellar arteries are visualized.    IMPRESSION:    CTA Neck: No CT evidence of acute traumatic vascular injury involving the cervical carotid or vertebral arteries.    Right level 2 lymphadenopathy, just deep to the sternocleidomastoid muscle, which demonstrates areas of central hypoattenuation, which may reflect necrosis. Status post thyroidectomy. In the setting of prior malignancy, findings would be concerning for jd metastasis. Direct tissue sampling may be obtained as clinically warranted.    CT Right Knee 2021  FINDINGS:    BONE: No acute fracture. No dislocation. Severe cartilage space narrowing at the patellofemoral and medial tibiofemoral compartments with bone-on-bone articulation. Subchondral sclerosis and marginal osteophytes. Chondrocalcinosis.    SOFT TISSUE: Small knee joint effusion. No lipohemarthrosis. Diffuse muscular atrophy. Normal CT appearance of the imaged tendons. Vascular calcifications. Vessels are otherwise normal in course and caliber. Minimal edema in the subcutaneous fat anterior to the extensor mechanism.    IMPRESSION:  1.  No acute fracture or dislocation.  2.  Small knee joint effusion. No lipohemarthrosis.  3.  Severe osteoarthritis of the right knee.      Thank you for the opportunity to assist with the care of this patient.   Palliative Medicine Consult Service [phone].

## 2021-05-03 NOTE — DIETITIAN NUTRITION RISK NOTIFICATION - TREATMENT: THE FOLLOWING DIET HAS BEEN RECOMMENDED
Diet, Mechanical Soft:   Consistent Carbohydrate {Evening Snack} (CSTCHOSN)  Supplement Feeding Modality:  Oral  Ensure Enlive Cans or Servings Per Day:  1       Frequency:  Three Times a day  Health Shake Cans or Servings Per Day:  1       Frequency:  Two Times a day (05-02-21 @ 12:48) [Active]

## 2021-05-03 NOTE — PHYSICAL THERAPY INITIAL EVALUATION ADULT - PHYSICAL ASSIST/NONPHYSICAL ASSIST: SUPINE/SIT, REHAB EVAL
LOV: 9/6/18  Follow up: none scheduled due to follow up around 9/6/19  Requesting:   Disp Refills Start End    esomeprazole (NEXIUM) 40 MG capsule 90 capsule 0 1/15/2019     Sig: TAKE 1 CAPSULE BY MOUTH DAILY BEFORE BREAKFAST    Sent to pharmacy as: Esomeprazole Magnesium 40 MG Oral Capsule Delayed Release    Class: Eprescribe      Will fill at this time      1 person assist

## 2021-05-03 NOTE — PHYSICAL THERAPY INITIAL EVALUATION ADULT - CRITERIA FOR SKILLED THERAPEUTIC INTERVENTIONS
JOVANNI/impairments found/anticipated equipment needs at discharge/anticipated discharge recommendation

## 2021-05-03 NOTE — PROGRESS NOTE ADULT - ATTENDING COMMENTS
Patient was seen and examined at bedside and noted to be doing well, in collar due to ? cervical spine fracture.  Unable to obtain MRI due to BiVaicd is not MRI compatible   Telemetry: AV sequential pacing     Labs: reviewed   TTE: Summary:   1. Left ventricular ejection fraction, by visual estimation, is 65 to 70%.   2. Normal global left ventricular systolic function.   3. LV Ejection Fraction by Powell's Method with a biplane EF of 70 %.  4. Mechanical prosthesis in the aortic valve position.  5. Aortic valve is normally functioning mechanical prosthetic valve, with peak velocity of 2.87 m/s.    Based on Interrogation of device: NOT MRI COMPATIBLE     Dx: Perioperative cardiovascular assessment, rheumatic heart disease, bicuspid aortic valve s/p mechanical AVR 1993 on coumadin (noted abnormal stenosis on 4/2019 TTE- pt refused reop AVR), BiV ICD (MDT-MRI incompatible) placed after sudden death 1993 w/gen change/upgrade 2016, diastolic HFpEF (4/2019 EF 67%. Gr II DD)  - patient noted to be euvolemic with no complaints of any chest pain or shortness of breath   - patient had prior TTE in 2019 showing normal LVEF of 67 %  with Grade II diastolic dysfunction and elevated LAP >30 and mechanical prosthesis in the aortic valve position  with abnormal stenosis of the aortic prosthesis with  ms  ms AT/ET 0.30and DI : 0.26  - Current TTE shows normal peak velocity through the AV mechanical prosthesis, this discrepancy may require JENNIFER but at this time due to ? cervical spine fracture will be high risk for JENNIFER    - patient in the past has refused reop due to above findings   - unable to optimize cardiovascularly, and patient would be elevated bleeding risk as patient will require AC due to mechanical prosthesis  - INR 5 would not recommend giving K-Centra and continue to hold AC; goal INR for mechanical prosthesis in the aortic position is target of 2.5 (range 2.0-3.0); recommend to continue HOLDing Coumadin and only give KCentra if there are any signs of bleeding and/or INR >10   - BiVAICD interrogated and NOT MRI compatible   -patient looks dry on physical exam and would hold Lasix at this time and restart when volume status is better, no IVF encourage oral hydration   - continue with antihypertensives as blood pressure can tolerate   - based on this unable to proceed with any neurosurgical intervention and thus will continue with conservative management    - will sign off   Thank you for allowing me to participate in care of your patient.   Please call as needed.     Karen Plummer D.O. [facility]  Cardiology/Vascular Cardiology -[facility]   Telephone # [phone] .

## 2021-05-03 NOTE — PROGRESS NOTE ADULT - ASSESSMENT
85yo female w/PMH PMH cPatient presented to the ED s/p mechanical fall, was transferring from her walker to 's seated walker, fell back hitting head/neck on chair, denies LOC.  Now w/suspected C1, C2 fx in c-collar. Of note INR has been supratherapeutic, currently 4.1, has been holding coumadin since Friday 4/30 per Dr. Prieto instruction.  Patient Jehova's witness and would not like to receive any blood product. c/o neck and lower back pain s/p fall. Denies exertional chest pain/pressure, palpitations, irregular and/or rapid heart beat, SOB, BURGOS, syncope/near syncope, dizziness, orthopnea, PND, cough, edema, f/c, n/v/d, hematuria, or hematochezia.     5/3- patient sitting comfortably in chair w/ at bedside. Denies cardiac complaint. Has mild neck discomfort from c-collar.    Mechanical AVR  -TTE 5/2: EF 65-70%, Gr I DD, septal wma consistent w/post op, mod to severe mitral annular calcification, moderately decreased mitral leaflet mobility  -c/t hold coumadin, goal INR 2. vitamin K ONLY recommended for INR>10 or signs of bleeding    Diastolic CHFpEF  -TTE 5/2: EF 65-70%, Gr I DD, septal wma consistent w/post op, mod to severe mitral annular calcification, moderately decreased mitral leaflet mobility  -appears euvolemic/dry, encourage PO hydration  -hold home Lasix 20mg PO BID  -c/w coreg 6.25mg BID    Cardiac clearance  -MDT BiV ICD NOT MRI compatible per EP. Interrogation pending  -TTE 5/2: EF 65-70%, Gr I DD, septal wma consistent w/post op, mod to severe mitral annular calcification, moderately decreased mitral leaflet mobility    Elle Rodriguez, NP  [phone]     Assessment and recommendations are final when note is signed by the attending.

## 2021-05-03 NOTE — PHYSICAL THERAPY INITIAL EVALUATION ADULT - GENERAL OBSERVATIONS, REHAB EVAL
Pt received supine in bed + telemetry//BP cuff + IV + CAIR boots + [place] J C-collar c/o "it's ok, its a 10/10" pain, pt agreeable to PT

## 2021-05-03 NOTE — PROGRESS NOTE ADULT - SUBJECTIVE AND OBJECTIVE BOX
FULL NOTE TO FOLLOW                                                                 [facility]                                                               Office: [address] [address]                                                              Telephone: [phone]. Fax:[phone]                                                                             PROGRESS NOTE  Reason for follow up: mechanical AVR, supratherapeutic INR  COVID Status: 5/1- not detected  Update: 5/3- patient sitting comfortably in chair w/ at bedside. Denies cardiac complaint. Has mild neck discomfort from c-collar.      Review of symptoms:   All review of systems negative unless indicated otherwise above.     	  Vitals:  T(C): 36.7 (05-03-21 @ 07:00), Max: 36.9 (05-02-21 @ 15:31)  HR: 60 (05-03-21 @ 12:00) (42 - 74)  BP: 112/46 (05-03-21 @ 12:00) (91/77 - 120/66)  RR: 13 (05-03-21 @ 12:00) (13 - 20)  SpO2: 100% (05-03-21 @ 12:00) (93% - 100%)  Wt(kg): --  I&O's Summary    02 May 2021 07:01  -  03 May 2021 07:00  --------------------------------------------------------  IN: 225 mL / OUT: 850 mL / NET: -625 mL    03 May 2021 07:01  -  03 May 2021 12:18  --------------------------------------------------------  IN: 675 mL / OUT: 420 mL / NET: 255 mL      Weight (kg): 67 (05-01 @ 17:34)      PHYSICAL EXAM:  Appearance: NAD, well nourished	  Neurologic: A&Ox3, PERRL, EOMI, Grossly non-focal with full strength in all four extremities. C-Collar in place  HEENT: Normal oral mucosa, sclera non-icteric	  Lymphatic: No cervical lymphadenopathy  Cardiovascular: Normal S1 S2, No JVD, No murmur, No carotid bruits  Respiratory: Lungs clear to auscultation	  Psychiatry: Mood & affect appropriate  Gastrointestinal:  Soft, Non-tender, + BS, no bruits	  Extremities: Normal range of motion, No clubbing, cyanosis or edema  Vascular: Peripheral pulses palpable 2+ bilaterally  Skin: No Erythema, No ecchymoses, No cyanosis, No rashes  Lines and Tubes: philippe      CURRENT MEDICATIONS:  carvedilol 6.25 milliGRAM(s) Oral every 12 hours    levETIRAcetam  melatonin  insulin lispro (ADMELOG) corrective regimen sliding scale  levothyroxine  simvastatin  multiple electrolytes Injection Type 1      DIAGNOSTIC TESTING:  [ ] Echocardiogram:   < from: TTE Echo Complete w/ Contrast w/ Doppler (05.02.21 @ 13:25) >  Summary:   1. Left ventricular ejection fraction, by visual estimation, is 65 to 70%.   2. Normal global left ventricular systolic function.   3. LV Ejection Fraction by Powell's Method with a biplaneEF of 70 %.   4. Abnormal septal motion consistent with post-operative status.   5. Normal left ventricular internal cavity size.   6. Spectral Doppler shows impaired relaxation pattern of left ventricular myocardial filling (Grade I diastolic dysfunction).   7. Mildly enlarged right ventricle.   8. Moderately enlarged right atrium.   9. Normal left atrial size.  10. Moderate thickening and calcification of the anterior and posterior mitral valve leaflets.  11. Moderate to severe mitral annular calcification.  12. Moderately decreased mitral leaflet mobility.  13. Trace mitral valve regurgitation.  14. Mitral valve mean gradient is 4.0 mmHg consistent with mild mitral stenosis.  15. Mild tricuspid regurgitation.  16. Mechanical prosthesis in the aortic valve position.  17. Aortic valve is normally functioning mechanical prosthetic valve, with peak velocity of 2.87 m/s.  18. Mild aortic regurgitation.  19. Small pericardial effusion.  20. Based on ambulatory TTE done in 2019, there is a discrepancy between the velocities obtained through the mechanical prosthesis and if clinically warranted may consider JENNIFER for further evaluation. Otherwise no change in left ventricular function.    Karen Plummer D.O., Electronically signed on 5/2/2021 at 4:41:59 PM    < end of copied text >        LABS:	 	                            10.2   7.17  )-----------( 102      ( 03 May 2021 05:49 )             31.4     05-03    139  |  99  |  52.0<H>  ----------------------------<  92  4.2   |  29.0  |  1.37<H>    Ca    8.4<L>      03 May 2021 05:50  Phos  2.8     05-03  Mg     1.7     05-03    TPro  6.5<L>  /  Alb  3.6  /  TBili  0.4  /  DBili  x   /  AST  318<H>  /  ALT  101<H>  /  AlkPhos  75  05-01    proBNP:   Lipid Profile:   HgA1c:   TSH:       TELEMETRY: AV Paced 60bpm, few PVCs

## 2021-05-03 NOTE — PROGRESS NOTE ADULT - ATTENDING COMMENTS
87 yo F with PMH DM, HTN, cardiomyopathy s/p AICD, AVR on coumadin is s/p fall wher she suffered an atlanto-occipital subluxation.   AAOx3, GCS 15, Working with PT and getting OOB.  +c-collar  PUL CTA  CV Paced  GI benign  MS BENAVIDEZ  Urine with sediment and + UA but no WBC increase or shift  INR 5.9  Plan  1. Hold coumandin and allow INR to drift  2. BG elevated and will increase Insulin SSC  3. Stable to transfer to floor      code [id number]

## 2021-05-03 NOTE — DIETITIAN INITIAL EVALUATION ADULT. - PERTINENT LABORATORY DATA
05-03 Na139 mmol/L Glu 92 mg/dL K+ 4.2 mmol/L Cr  1.37 mg/dL<H> BUN 52.0 mg/dL<H> Phos 2.8 mg/dL Alb n/a   PAB n/a

## 2021-05-03 NOTE — DIETITIAN INITIAL EVALUATION ADULT. - PROBLEM SELECTOR PLAN 3
Statement Selected
Admit to SICU  maintain cervical collar  neurosurgery consult  neuro checks   will order CTA of the neck to exclude vascular injury

## 2021-05-03 NOTE — CONSULT NOTE ADULT - ASSESSMENT
86F with OA, DM, HTN, CM s/p AICD, bicuspid AV s/p AVR on coumadin admitted to SICU s/p fall at home, found with atlantoaxial subluxation, no plans for surgical intervention and managed with conservative medical management and on cervical collar.    PLAN    S/P Fall  Atlantoaxial Subluxation  - neurosurgery following, no immediate need for surgery, maintained on miami J collar    Acute Pain  - recommend PO Tylenol 975 mg q6H PRN   - avoid opioids and IV ibuprofen given age and pt being on coumadin respectively    Fraility/Debility  - at baseline, pt ambulates with walker and requires assist with most ADLs  - pt is receptive to rehab on discharge when medically optimized    Cachexia  - likely underlying protein calorie malnutrition which could also contribute to fluctuating INR levels  - encourage PO intake and ensure supplement    Advance Care Planning  - per SICU SHERRON redman with pt on 3/2, pt wishes to be full code    Palliative Care Encounter  Met with pt and  to palliative service. Pt express limited support other than each other, no other close relatives or friends that check in daily. They have two sons ([place] and [place]) whom they see once every 3 years but do converse via phone. Pt indicated son Og is coming up from [place] this weekend.     During discussion, pt's  did express concern of being able to care for pt as he himself is 89, frail and has his own medical issues. He indicated difficulty getting around with his walker as he has chronic BLE edema, being able to keep up with cooking and maintaining the interior of his home, stated "the outside looks great      86F with OA, DM, HTN, CM s/p AICD, bicuspid AV s/p AVR on coumadin admitted to SICU s/p fall at home, found with atlantoaxial subluxation, no plans for surgical intervention and managed with conservative medical management and on cervical collar.    PLAN    S/P Fall  Atlantoaxial Subluxation  - neurosurgery following, no immediate need for surgery, maintained on miami J collar    Acute Pain  - recommend PO Tylenol 975 mg q6H PRN   - avoid opioids and IV ibuprofen given age and pt being on coumadin respectively    Fraility/Debility  - at baseline, pt ambulates with walker and requires assist with most ADLs  - pt is receptive to rehab on discharge when medically optimized    Cachexia  - likely underlying protein calorie malnutrition which could also contribute to fluctuating INR levels  - encourage PO intake and ensure supplement    Advance Care Planning  - per SICU SHERRON Pedroza goc with pt on 3/2, pt wishes to be full code    Palliative Care Encounter  Met with pt and  to palliative service. Pt express limited support other than each other, no other close relatives or friends that check in daily. They have two sons ([place] and [place]) whom they see once every 3 years but do converse via phone. Pt indicated son Og is coming up from [place] this weekend.     During discussion, pt's  did express concern of being able to care for pt as he himself is 89, frail and has his own medical issues. He indicated difficulty getting around with his walker as he has chronic BLE edema, being able to keep up with cooking and maintaining the interior of his home, stated "the outside looks great cause I have a  but inside I don't even know where to begin." I spoke to them about consideration of long term placement after rehab however, pt express concern of being able to financially afford it. D/W unit JUAN Horowitz to assist in exploring options for a safe disposition.     Both palliative and SW offer to call sons to update them but both pt/ declined. Will continue to follow for pain/sx management and further goc.

## 2021-05-03 NOTE — DIETITIAN INITIAL EVALUATION ADULT. - OTHER INFO
Pt is a 86 year old Female with PMH of arthritis, DM, HTN, cardiomyopathy s/p AICD placement, and biscusp aortic Valve s/p AVR (1993) on coumadin who presents after mechanical fall backwards, landing on head. Patient was transferring from her walker to her 's when he accidently let go and the walker slipped. Patient landed backwards onto head, but denies loss of consciousness. Complains or posterior head/ neck pain as well as lower back pain, . Of note, patient is Denominational does not want to receive blood products. INR supratherapeutic at 4.1, reportedly 5 on recent testing 2 days ago so has stopped coumadin for past 2 days.   CT C Spine shows upward subluxation of the odontoid into the foramen magnum. Pt with C-collar in place.  Pt reports eating fairly well at home, denies difficulty chewing or swallowing. Pt is unsure if she has lost any weight. Pt with multiple areas of skin breakdown noted. (Stage 1 B/L heels, DTI B/L Butt and upper thigh, Stage II butt). Brief NFPE conducted.

## 2021-05-03 NOTE — PHYSICAL THERAPY INITIAL EVALUATION ADULT - ADDITIONAL COMMENTS
Pt lives in a private home with her elderly spouse (Spouse has cancer dx, unable to physically assist) Ramp to enter, No steps. Four steps inside with handrails to den/laundry room. Pt was ambulatory PTA with a RW. 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

## 2021-05-03 NOTE — PROGRESS NOTE ADULT - SUBJECTIVE AND OBJECTIVE BOX
24h Events:  no acute events overnight, fitted for C collar by orthotist. Blood glucose elevated, Insulin sliding scale increased.     ICU Vital Signs Last 24 Hrs  T(C): 36.6 (03 May 2021 04:00), Max: 36.9 (02 May 2021 15:31)  T(F): 97.9 (03 May 2021 04:00), Max: 98.4 (02 May 2021 15:31)  HR: 60 (03 May 2021 05:00) (0 - 74)  BP: 93/44 (03 May 2021 05:00) (84/67 - 116/98)  BP(mean): 59 (03 May 2021 05:00) (56 - 106)  ABP: --  ABP(mean): --  RR: 13 (03 May 2021 05:00) (13 - 20)  SpO2: 96% (03 May 2021 05:00) (93% - 100%)    I&O's Detail    01 May 2021 07:01  -  02 May 2021 07:00  --------------------------------------------------------  IN:    IV PiggyBack: 100 mL    multiple electrolytes Injection Type 1.: 525 mL  Total IN: 625 mL    OUT:    Voided (mL): 350 mL  Total OUT: 350 mL    Total NET: 275 mL    02 May 2021 07:01  -  03 May 2021 06:14  --------------------------------------------------------  IN:    multiple electrolytes Injection Type 1.: 225 mL  Total IN: 225 mL    OUT:    Voided (mL): 650 mL  Total OUT: 650 mL    Total NET: -425 mL    MEDICATIONS  (STANDING):  carvedilol 6.25 milliGRAM(s) Oral every 12 hours  insulin lispro (ADMELOG) corrective regimen sliding scale   SubCutaneous Before meals and at bedtime  levETIRAcetam 750 milliGRAM(s) Oral two times a day  levothyroxine 112 MICROGram(s) Oral daily  melatonin 3 milliGRAM(s) Oral at bedtime  simvastatin 40 milliGRAM(s) Oral at bedtime    MEDICATIONS  (PRN):    Physical Exam:    Gen: Resting comfortably in bed    HEENT: PERRL, EOMI    Neurological: Alert and oriented x3 without focal deficit, moving all extremities    Neck: Trachea midline, no evidence of JVD, FROM without pain, neck symmetric    Pulmonary: CTAB with decreased breath sounds at the bases    Cardiovascular: S1S2    Gastrointestinal: Soft, non-tender, non-distended    : voiding    Extremities: Without c/c/e    Skin: Intact    Musculoskeletal: limited ROM of neck due to c collar s/p atlantoaxial subluxation    LABS:  CBC Full  -  ( 03 May 2021 05:49 )  WBC Count : 7.17 K/uL  RBC Count : 3.36 M/uL  Hemoglobin : 10.2 g/dL  Hematocrit : 31.4 %  Platelet Count - Automated : 102 K/uL  Mean Cell Volume : 93.5 fl  Mean Cell Hemoglobin : 30.4 pg  Mean Cell Hemoglobin Concentration : 32.5 gm/dL  Auto Neutrophil # : 5.11 K/uL  Auto Lymphocyte # : 1.08 K/uL  Auto Monocyte # : 0.72 K/uL  Auto Eosinophil # : 0.19 K/uL  Auto Basophil # : 0.03 K/uL  Auto Neutrophil % : 71.3 %  Auto Lymphocyte % : 15.1 %  Auto Monocyte % : 10.0 %  Auto Eosinophil % : 2.6 %  Auto Basophil % : 0.4 %    05-02    142  |  100  |  32.0<H>  ----------------------------<  120<H>  4.3   |  24.0  |  1.00    Ca    9.2      02 May 2021 04:23  Phos  4.2     05-02  Mg     1.7     05-02    TPro  6.5<L>  /  Alb  3.6  /  TBili  0.4  /  DBili  x   /  AST  318<H>  /  ALT  101<H>  /  AlkPhos  75  05-01    PT/INR - ( 02 May 2021 04:23 )   PT: 50.2 sec;   INR: 4.66 ratio       PTT - ( 02 May 2021 04:23 )  PTT:39.0 sec  Urinalysis Basic - ( 01 May 2021 19:44 )    Color: Yellow / Appearance: Slightly Turbid / S.010 / pH: x  Gluc: x / Ketone: Trace  / Bili: Negative / Urobili: Negative   Blood: x / Protein: 30 mg/dL / Nitrite: Positive   Leuk Esterase: Moderate / RBC: 25-50 /HPF / WBC 26-50   Sq Epi: x / Non Sq Epi: Few / Bacteria: Moderate    RECENT CULTURES:    LIVER FUNCTIONS - ( 01 May 2021 14:52 )  Alb: 3.6 g/dL / Pro: 6.5 g/dL / ALK PHOS: 75 U/L / ALT: 101 U/L / AST: 318 U/L / GGT: x           ASSESSMENT/PLAN:  86y Female w/ atlanto-occipital subluxation.     Neuro: Pain control with tylenol, melatonin for sleep hygiene, q4 hour neurochecks. keppra for seizure hx    CV: no issues, on home carvedilol and statin    Pulm: RA, encourage coughing and deep breathing    GI/Nutrition: consistent carb mechanical soft diet    /Renal: only voided 200 ml overnight, bladder scan early this AM with > 400 ml in bladder. F/U repeat bladder scan @ 9 AM and straigth cath if necessary.     ID: no issues, afebrile, WBC within normal limits     Endo: synthroid, FS TIDACHS, ISS    Skin: Repositioning for DTI prevention while in bed    Heme/DVT Prophylaxis: SCDs, no chemical AC, trend INR    Lines/Tubes: PIVs    Dispo: downgrade to surgical floor this afternoon

## 2021-05-03 NOTE — PHYSICAL THERAPY INITIAL EVALUATION ADULT - PERTINENT HX OF CURRENT PROBLEM, REHAB EVAL
87yo F w PMH of arthritis, DM, HTN, cardiomyopathy s/p AICD placement, and biscusp aortic Valve s/p AVR (1993) on coumadin who presents after mechanical fall backwards, landing on head. Pt admitted for Atlantoaxial Subluxation as seen on CT scan. Pt unable to have MRI due to PPM.

## 2021-05-04 LAB
ANION GAP SERPL CALC-SCNC: 10 MMOL/L — SIGNIFICANT CHANGE UP (ref 5–17)
APPEARANCE UR: ABNORMAL
APTT BLD: 43.5 SEC — HIGH (ref 27.5–35.5)
BACTERIA # UR AUTO: ABNORMAL
BASOPHILS # BLD AUTO: 0.01 K/UL — SIGNIFICANT CHANGE UP (ref 0–0.2)
BASOPHILS NFR BLD AUTO: 0.2 % — SIGNIFICANT CHANGE UP (ref 0–2)
BILIRUB UR-MCNC: NEGATIVE — SIGNIFICANT CHANGE UP
BUN SERPL-MCNC: 56 MG/DL — HIGH (ref 8–20)
CALCIUM SERPL-MCNC: 8.3 MG/DL — LOW (ref 8.6–10.2)
CHLORIDE SERPL-SCNC: 100 MMOL/L — SIGNIFICANT CHANGE UP (ref 98–107)
CO2 SERPL-SCNC: 27 MMOL/L — SIGNIFICANT CHANGE UP (ref 22–29)
COLOR SPEC: SIGNIFICANT CHANGE UP
CREAT ?TM UR-MCNC: 26 MG/DL — SIGNIFICANT CHANGE UP
CREAT SERPL-MCNC: 1.4 MG/DL — HIGH (ref 0.5–1.3)
DIFF PNL FLD: ABNORMAL
EOSINOPHIL # BLD AUTO: 0.15 K/UL — SIGNIFICANT CHANGE UP (ref 0–0.5)
EOSINOPHIL NFR BLD AUTO: 2.4 % — SIGNIFICANT CHANGE UP (ref 0–6)
EPI CELLS # UR: SIGNIFICANT CHANGE UP
GLUCOSE BLDC GLUCOMTR-MCNC: 117 MG/DL — HIGH (ref 70–99)
GLUCOSE BLDC GLUCOMTR-MCNC: 120 MG/DL — HIGH (ref 70–99)
GLUCOSE BLDC GLUCOMTR-MCNC: 154 MG/DL — HIGH (ref 70–99)
GLUCOSE BLDC GLUCOMTR-MCNC: 174 MG/DL — HIGH (ref 70–99)
GLUCOSE SERPL-MCNC: 103 MG/DL — HIGH (ref 70–99)
GLUCOSE UR QL: NEGATIVE MG/DL — SIGNIFICANT CHANGE UP
HCT VFR BLD CALC: 29.2 % — LOW (ref 34.5–45)
HGB BLD-MCNC: 9.8 G/DL — LOW (ref 11.5–15.5)
IMM GRANULOCYTES NFR BLD AUTO: 0.6 % — SIGNIFICANT CHANGE UP (ref 0–1.5)
INR BLD: 3.89 RATIO — HIGH (ref 0.88–1.16)
KETONES UR-MCNC: NEGATIVE — SIGNIFICANT CHANGE UP
LEUKOCYTE ESTERASE UR-ACNC: ABNORMAL
LYMPHOCYTES # BLD AUTO: 0.9 K/UL — LOW (ref 1–3.3)
LYMPHOCYTES # BLD AUTO: 14.4 % — SIGNIFICANT CHANGE UP (ref 13–44)
MAGNESIUM SERPL-MCNC: 2.4 MG/DL — SIGNIFICANT CHANGE UP (ref 1.6–2.6)
MCHC RBC-ENTMCNC: 30.2 PG — SIGNIFICANT CHANGE UP (ref 27–34)
MCHC RBC-ENTMCNC: 33.6 GM/DL — SIGNIFICANT CHANGE UP (ref 32–36)
MCV RBC AUTO: 89.8 FL — SIGNIFICANT CHANGE UP (ref 80–100)
MONOCYTES # BLD AUTO: 0.52 K/UL — SIGNIFICANT CHANGE UP (ref 0–0.9)
MONOCYTES NFR BLD AUTO: 8.3 % — SIGNIFICANT CHANGE UP (ref 2–14)
NEUTROPHILS # BLD AUTO: 4.62 K/UL — SIGNIFICANT CHANGE UP (ref 1.8–7.4)
NEUTROPHILS NFR BLD AUTO: 74.1 % — SIGNIFICANT CHANGE UP (ref 43–77)
NITRITE UR-MCNC: NEGATIVE — SIGNIFICANT CHANGE UP
OSMOLALITY UR: 204 MOSM/KG — LOW (ref 300–1000)
PH UR: 6 — SIGNIFICANT CHANGE UP (ref 5–8)
PHOSPHATE SERPL-MCNC: 3 MG/DL — SIGNIFICANT CHANGE UP (ref 2.4–4.7)
PLATELET # BLD AUTO: 107 K/UL — LOW (ref 150–400)
POTASSIUM SERPL-MCNC: 4.5 MMOL/L — SIGNIFICANT CHANGE UP (ref 3.5–5.3)
POTASSIUM SERPL-SCNC: 4.5 MMOL/L — SIGNIFICANT CHANGE UP (ref 3.5–5.3)
PROT UR-MCNC: 30 MG/DL
PROTHROM AB SERPL-ACNC: 42.3 SEC — HIGH (ref 10.6–13.6)
RBC # BLD: 3.25 M/UL — LOW (ref 3.8–5.2)
RBC # FLD: 13.4 % — SIGNIFICANT CHANGE UP (ref 10.3–14.5)
RBC CASTS # UR COMP ASSIST: ABNORMAL /HPF (ref 0–4)
SARS-COV-2 RNA SPEC QL NAA+PROBE: SIGNIFICANT CHANGE UP
SODIUM SERPL-SCNC: 137 MMOL/L — SIGNIFICANT CHANGE UP (ref 135–145)
SODIUM UR-SCNC: <30 MMOL/L — SIGNIFICANT CHANGE UP
SP GR SPEC: 1 — LOW (ref 1.01–1.02)
UROBILINOGEN FLD QL: NEGATIVE MG/DL — SIGNIFICANT CHANGE UP
WBC # BLD: 6.24 K/UL — SIGNIFICANT CHANGE UP (ref 3.8–10.5)
WBC # FLD AUTO: 6.24 K/UL — SIGNIFICANT CHANGE UP (ref 3.8–10.5)
WBC UR QL: >50

## 2021-05-04 PROCEDURE — 99231 SBSQ HOSP IP/OBS SF/LOW 25: CPT | Mod: GC

## 2021-05-04 PROCEDURE — 99233 SBSQ HOSP IP/OBS HIGH 50: CPT

## 2021-05-04 RX ORDER — SENNA PLUS 8.6 MG/1
2 TABLET ORAL AT BEDTIME
Refills: 0 | Status: DISCONTINUED | OUTPATIENT
Start: 2021-05-04 | End: 2021-05-06

## 2021-05-04 RX ORDER — MAGNESIUM HYDROXIDE 400 MG/1
30 TABLET, CHEWABLE ORAL DAILY
Refills: 0 | Status: DISCONTINUED | OUTPATIENT
Start: 2021-05-04 | End: 2021-05-06

## 2021-05-04 RX ADMIN — Medication 3 MILLIGRAM(S): at 21:07

## 2021-05-04 RX ADMIN — SENNA PLUS 2 TABLET(S): 8.6 TABLET ORAL at 21:07

## 2021-05-04 RX ADMIN — Medication 112 MICROGRAM(S): at 05:30

## 2021-05-04 RX ADMIN — SODIUM CHLORIDE 75 MILLILITER(S): 9 INJECTION, SOLUTION INTRAVENOUS at 05:29

## 2021-05-04 RX ADMIN — Medication 2: at 11:22

## 2021-05-04 RX ADMIN — SODIUM CHLORIDE 75 MILLILITER(S): 9 INJECTION, SOLUTION INTRAVENOUS at 21:08

## 2021-05-04 RX ADMIN — CARVEDILOL PHOSPHATE 6.25 MILLIGRAM(S): 80 CAPSULE, EXTENDED RELEASE ORAL at 16:44

## 2021-05-04 RX ADMIN — LEVETIRACETAM 750 MILLIGRAM(S): 250 TABLET, FILM COATED ORAL at 05:30

## 2021-05-04 RX ADMIN — LEVETIRACETAM 750 MILLIGRAM(S): 250 TABLET, FILM COATED ORAL at 16:44

## 2021-05-04 RX ADMIN — Medication 2: at 21:07

## 2021-05-04 RX ADMIN — SIMVASTATIN 40 MILLIGRAM(S): 20 TABLET, FILM COATED ORAL at 21:07

## 2021-05-04 NOTE — PROGRESS NOTE ADULT - ASSESSMENT
86F with OA, DM, HTN, CM s/p AICD, bicuspid AV s/p AVR on coumadin admitted to SICU s/p fall at home, found with atlantoaxial subluxation, no plans for surgical intervention and managed with conservative medical management and on cervical collar.    PLAN    S/P Fall  Atlantoaxial Subluxation  - seen by NSX, no plans for surgery, conservative medical mgnt with miami J collar    Acute Pain  - stable, intermittent with discomfort from brace  - c/w PO Tylenol 650mg q6H PRN   - avoid opioids and IV ibuprofen given age and pt being on coumadin respectively    Fraility/Debility  - at baseline, pt ambulates small distances with walker and mostly dependent for ADLs  - pt is receptive to rehab on discharge when medically optimized    Cachexia  - encourage PO intake and ensure supplement    Advance Care Planning  - per SICU SHERRON Pedroza goc with pt on 3/2, pt wishes to be full code    Palliative Care Encounter  Met with patient today.   Reviewed advance directives, she confirmed FULL CODE status. I encouraged her to have ongoing discussions with both  and her two sons regarding her wishes pertaining life sustaining interventions (CPR and intubation) and the role/importance of having a HCP. She acknowledged understanding.     Pt remains hopeful to be able to return back to previous baseline and eventually go home. She understands that she will likely require rehab prior to going home given her debility and deconditioning and is amendable to this plan.     D/W unit SW, informed that an APS case was initiated due to concern of  being able to care for self. Pt will need additional support when she does return home as  has expressed difficulty providing level of care needed by wife.     GOC and directives established. Pain stable, continue current analgesic. No further recommendations from a pallaitive standpoint. Will sign off. Dispo per CCC. Medical mgnt per trauma team.  86F with OA, DM, HTN, CM s/p AICD, bicuspid AV s/p AVR on coumadin admitted to SICU s/p fall at home, found with atlantoaxial subluxation, no plans for surgical intervention and managed with conservative medical management and on cervical collar.    PLAN    S/P Fall  Atlantoaxial Subluxation  - seen by NSX, no plans for surgery, conservative medical mgnt with miami J collar    Acute Pain  - stable, intermittent with discomfort from brace  - c/w PO Tylenol 650mg q6H PRN   - avoid opioids and IV ibuprofen given age and pt being on coumadin respectively    Constipation  - per pt last known BM was 1 week ago  - senna and milk of mag added today by primary team    Fraility/Debility  - at baseline, pt ambulates small distances with walker and mostly dependent for ADLs  - pt is receptive to rehab on discharge when medically optimized    Cachexia  - encourage PO intake and ensure supplement    Advance Care Planning  - per SICU SHERRON redman with pt on 3/2, pt wishes to be full code    Palliative Care Encounter  Met with patient today.   Reviewed advance directives, she confirmed FULL CODE status. I encouraged her to have ongoing discussions with both  and her two sons regarding her wishes pertaining life sustaining interventions (CPR and intubation) and the role/importance of having a HCP. She acknowledged understanding.     Pt remains hopeful to be able to return back to previous baseline and eventually go home. She understands that she will likely require rehab prior to going home given her debility and deconditioning and is amendable to this plan.     D/W unit SW, informed that an APS case was initiated due to concern of  being able to care for self. Pt will need additional support when she does return home as  has expressed difficulty providing level of care needed by wife.     GOC and directives established. Pain stable, continue current analgesic. No further recommendations from a pallaitive standpoint. Will sign off. Dispo per CCC. Medical mgnt per trauma team.

## 2021-05-04 NOTE — PROGRESS NOTE ADULT - SUBJECTIVE AND OBJECTIVE BOX
[facility] PALLIATIVE MEDICINE FOLLOW UP VISIT    INTERVAL HPI/OVERNIGHT EVENTS:  Source if other than patient:  []Family   [x]Team     No acute events overnight.   Downgraded out of SICU yesterday.   Pt states doing well, was able to sleep and offered no acute complaints.     Present Symptoms:   Dyspnea:  No   Nausea/Vomiting:  No  Anxiety:   No  Depression:  No  Fatigue:  No  Loss of appetite:  No  Constipation: Yes, states last BM was 1 week ago  Pain: intermittent mostly at neck due to brace, other feels comfortable at time of visit    MEDICATIONS  (STANDING):  carvedilol 6.25 milliGRAM(s) Oral every 12 hours  insulin lispro (ADMELOG) corrective regimen sliding scale   SubCutaneous Before meals and at bedtime  levETIRAcetam 750 milliGRAM(s) Oral two times a day  levothyroxine 112 MICROGram(s) Oral daily  melatonin 3 milliGRAM(s) Oral at bedtime  multiple electrolytes Injection Type 1 1000 milliLiter(s) (75 mL/Hr) IV Continuous <Continuous>  senna 2 Tablet(s) Oral at bedtime  simvastatin 40 milliGRAM(s) Oral at bedtime    MEDICATIONS  (PRN):  acetaminophen   Tablet .. 650 milliGRAM(s) Oral every 6 hours PRN Temp greater or equal to 38C (100.4F), Mild Pain (1 - 3)  magnesium hydroxide Suspension 30 milliLiter(s) Oral daily PRN Constipation      PHYSICAL EXAM:    Vital Signs Last 24 Hrs  T(C): 36.6 (04 May 2021 11:14), Max: 37 (03 May 2021 15:17)  T(F): 97.8 (04 May 2021 11:14), Max: 98.6 (03 May 2021 15:17)  HR: 80 (04 May 2021 11:14) (57 - 80)  BP: 135/72 (04 May 2021 11:14) (95/56 - 135/72)  BP(mean): 65 (03 May 2021 17:00) (63 - 94)  RR: 19 (04 May 2021 11:14) (12 - 19)  SpO2: 96% (04 May 2021 04:41) (90% - 99%)    Palliative Performance Status Version 2:   http://npcrc.org/files/news/palliative_performance_scale_[email or web address].pdf    General: frail. cachetic. Resting comfortably. No acute distress.   HEENT: mucous membrane moist    Lungs: comfortable. non-labored.   CV: +s1/s2. Regular rate and rhythm.    GI: +bowel sound. abdomen soft, non-tender, non-distended   MSK: Moves all 4 extremities. No cyanosis or edema. weakness.    Neuro: nonfocal. Awake and alert, oriented x4. Interactive   Skin: warm and dry.       LABS:                          9.8    6.24  )-----------( 107      ( 04 May 2021 08:45 )             29.2     05-04    137  |  100  |  56.0<H>  ----------------------------<  103<H>  4.5   |  27.0  |  1.40<H>    Ca    8.3<L>      04 May 2021 08:45  Phos  3.0     05-04  Mg     2.4     05-04      PT/INR - ( 04 May 2021 08:45 )   PT: 42.3 sec;   INR: 3.89 ratio         PTT - ( 04 May 2021 08:45 )  PTT:43.5 sec    RADIOLOGY & ADDITIONAL STUDIES: Reviewed, no new recent studies

## 2021-05-04 NOTE — PROGRESS NOTE ADULT - ASSESSMENT
86 year old female s/p fall sustaining atlantoaxial subluxation being managed non op with cervical collar for 6 weeks    -maintain cervical collar 6 weeks per neurosx  -encourage PO intake  -aspiration precautions  -philippe  -dvt ppx  -dispo: JOVANNI

## 2021-05-04 NOTE — PROGRESS NOTE ADULT - SUBJECTIVE AND OBJECTIVE BOX
downgraded from SICU overnight. no issues. pain controlled. currently in [facility]. tolerating po.         MEDICATIONS  (STANDING):  carvedilol 6.25 milliGRAM(s) Oral every 12 hours  insulin lispro (ADMELOG) corrective regimen sliding scale   SubCutaneous Before meals and at bedtime  levETIRAcetam 750 milliGRAM(s) Oral two times a day  levothyroxine 112 MICROGram(s) Oral daily  melatonin 3 milliGRAM(s) Oral at bedtime  multiple electrolytes Injection Type 1 1000 milliLiter(s) (75 mL/Hr) IV Continuous <Continuous>  simvastatin 40 milliGRAM(s) Oral at bedtime    MEDICATIONS  (PRN):  acetaminophen   Tablet .. 650 milliGRAM(s) Oral every 6 hours PRN Temp greater or equal to 38C (100.4F), Mild Pain (1 - 3)      Vital Signs Last 24 Hrs  T(C): 36.9 (03 May 2021 21:54), Max: 37 (03 May 2021 15:17)  T(F): 98.5 (03 May 2021 21:54), Max: 98.6 (03 May 2021 15:17)  HR: 62 (03 May 2021 21:54) (60 - 63)  BP: 120/62 (03 May 2021 21:54) (91/77 - 129/59)  BP(mean): 65 (03 May 2021 17:00) (59 - 94)  RR: 17 (03 May 2021 21:54) (12 - 20)  SpO2: 95% (03 May 2021 21:54) (90% - 100%)    Physical Exam:    Neurological:  no acute distress, no upper or lower extremity weakness/sensation deficits    HEENT: cervical collar in place    Neck: No bruits; no thyromegaly or nodules,  No JVD      Respiratory: Breath Sounds equal & clear to auscultation, no accessory muscle use    Cardiovascular: Regular rate & rhythm, normal S1, S2; no murmurs, gallops or rubs    Gastrointestinal: Soft, non-tender, normal bowel sounds    Extremities: No peripheral edema, No cyanosis, clubbing     Vascular: Equal and normal pulses: 2+ peripheral pulses throughout    Musculoskeletal: No joint pain, swelling or deformity; no limitation of movement    Skin: No rashes      I&O's Detail    02 May 2021 07:01  -  03 May 2021 07:00  --------------------------------------------------------  IN:    multiple electrolytes Injection Type 1.: 225 mL  Total IN: 225 mL    OUT:    Voided (mL): 850 mL  Total OUT: 850 mL    Total NET: -625 mL      03 May 2021 07:01  -  04 May 2021 00:10  --------------------------------------------------------  IN:    IV PiggyBack: 50 mL    IV PiggyBack: 250 mL    IV PiggyBack: 100 mL    multiple electrolytes Injection Type 1.: 900 mL  Total IN: 1300 mL    OUT:    Indwelling Catheter - Urethral (mL): 600 mL  Total OUT: 600 mL    Total NET: 700 mL          LABS:                        10.2   7.17  )-----------( 102      ( 03 May 2021 05:49 )             31.4     05-03    139  |  99  |  52.0<H>  ----------------------------<  92  4.2   |  29.0  |  1.37<H>    Ca    8.4<L>      03 May 2021 05:50  Phos  2.8     05-03  Mg     1.7     05-03      PT/INR - ( 03 May 2021 05:50 )   PT: 63.1 sec;   INR: 5.92 ratio         PTT - ( 03 May 2021 05:50 )  PTT:39.8 sec      RADIOLOGY & ADDITIONAL STUDIES:

## 2021-05-04 NOTE — PROGRESS NOTE ADULT - TIME BILLING
D/W trauma resident
Perioperative cardiovascular assessment, rheumatic heart disease, bicuspid aortic valve s/p mechanical AVR 1993 on coumadin (noted abnormal stenosis on 4/2019 TTE- pt refused reop AVR), BiV ICD (MDT-MRI incompatible) placed after sudden death 1993 w/gen change/upgrade 2016, diastolic HFpEF (4/2019 EF 67%. Gr II DD)

## 2021-05-04 NOTE — PROGRESS NOTE ADULT - ATTENDING COMMENTS
Agree with above assessment.  The patient was seen and examined by me.  The patient endorses any significant complaints.  She states she has no chest or abdominal pain. C-collar remains in place.  Abdomen is soft and non tender, no guarding, no rebound. Grossly neuro intact. Discharge planning once INR is in appropriate range.

## 2021-05-05 ENCOUNTER — TRANSCRIPTION ENCOUNTER (OUTPATIENT)
Age: 86
End: 2021-05-05

## 2021-05-05 LAB
ANION GAP SERPL CALC-SCNC: 10 MMOL/L — SIGNIFICANT CHANGE UP (ref 5–17)
BASOPHILS # BLD AUTO: 0.02 K/UL — SIGNIFICANT CHANGE UP (ref 0–0.2)
BASOPHILS NFR BLD AUTO: 0.3 % — SIGNIFICANT CHANGE UP (ref 0–2)
BUN SERPL-MCNC: 51 MG/DL — HIGH (ref 8–20)
CALCIUM SERPL-MCNC: 8.7 MG/DL — SIGNIFICANT CHANGE UP (ref 8.6–10.2)
CHLORIDE SERPL-SCNC: 102 MMOL/L — SIGNIFICANT CHANGE UP (ref 98–107)
CO2 SERPL-SCNC: 29 MMOL/L — SIGNIFICANT CHANGE UP (ref 22–29)
CREAT SERPL-MCNC: 1.34 MG/DL — HIGH (ref 0.5–1.3)
CULTURE RESULTS: SIGNIFICANT CHANGE UP
EOSINOPHIL # BLD AUTO: 0.12 K/UL — SIGNIFICANT CHANGE UP (ref 0–0.5)
EOSINOPHIL NFR BLD AUTO: 1.9 % — SIGNIFICANT CHANGE UP (ref 0–6)
GLUCOSE BLDC GLUCOMTR-MCNC: 133 MG/DL — HIGH (ref 70–99)
GLUCOSE BLDC GLUCOMTR-MCNC: 137 MG/DL — HIGH (ref 70–99)
GLUCOSE BLDC GLUCOMTR-MCNC: 139 MG/DL — HIGH (ref 70–99)
GLUCOSE BLDC GLUCOMTR-MCNC: 147 MG/DL — HIGH (ref 70–99)
GLUCOSE SERPL-MCNC: 117 MG/DL — HIGH (ref 70–99)
HCT VFR BLD CALC: 30.1 % — LOW (ref 34.5–45)
HGB BLD-MCNC: 9.9 G/DL — LOW (ref 11.5–15.5)
IMM GRANULOCYTES NFR BLD AUTO: 0.6 % — SIGNIFICANT CHANGE UP (ref 0–1.5)
INR BLD: 2.29 RATIO — HIGH (ref 0.88–1.16)
LYMPHOCYTES # BLD AUTO: 1.07 K/UL — SIGNIFICANT CHANGE UP (ref 1–3.3)
LYMPHOCYTES # BLD AUTO: 16.9 % — SIGNIFICANT CHANGE UP (ref 13–44)
MAGNESIUM SERPL-MCNC: 2.2 MG/DL — SIGNIFICANT CHANGE UP (ref 1.6–2.6)
MCHC RBC-ENTMCNC: 30.4 PG — SIGNIFICANT CHANGE UP (ref 27–34)
MCHC RBC-ENTMCNC: 32.9 GM/DL — SIGNIFICANT CHANGE UP (ref 32–36)
MCV RBC AUTO: 92.3 FL — SIGNIFICANT CHANGE UP (ref 80–100)
MONOCYTES # BLD AUTO: 0.62 K/UL — SIGNIFICANT CHANGE UP (ref 0–0.9)
MONOCYTES NFR BLD AUTO: 9.8 % — SIGNIFICANT CHANGE UP (ref 2–14)
NEUTROPHILS # BLD AUTO: 4.46 K/UL — SIGNIFICANT CHANGE UP (ref 1.8–7.4)
NEUTROPHILS NFR BLD AUTO: 70.5 % — SIGNIFICANT CHANGE UP (ref 43–77)
PHOSPHATE SERPL-MCNC: 3.5 MG/DL — SIGNIFICANT CHANGE UP (ref 2.4–4.7)
PLATELET # BLD AUTO: 117 K/UL — LOW (ref 150–400)
POTASSIUM SERPL-MCNC: 4.3 MMOL/L — SIGNIFICANT CHANGE UP (ref 3.5–5.3)
POTASSIUM SERPL-SCNC: 4.3 MMOL/L — SIGNIFICANT CHANGE UP (ref 3.5–5.3)
PROTHROM AB SERPL-ACNC: 25.5 SEC — HIGH (ref 10.6–13.6)
RBC # BLD: 3.26 M/UL — LOW (ref 3.8–5.2)
RBC # FLD: 13.5 % — SIGNIFICANT CHANGE UP (ref 10.3–14.5)
SODIUM SERPL-SCNC: 141 MMOL/L — SIGNIFICANT CHANGE UP (ref 135–145)
SPECIMEN SOURCE: SIGNIFICANT CHANGE UP
WBC # BLD: 6.33 K/UL — SIGNIFICANT CHANGE UP (ref 3.8–10.5)
WBC # FLD AUTO: 6.33 K/UL — SIGNIFICANT CHANGE UP (ref 3.8–10.5)

## 2021-05-05 PROCEDURE — 99233 SBSQ HOSP IP/OBS HIGH 50: CPT

## 2021-05-05 RX ORDER — WARFARIN SODIUM 2.5 MG/1
2 TABLET ORAL AT BEDTIME
Refills: 0 | Status: DISCONTINUED | OUTPATIENT
Start: 2021-05-05 | End: 2021-05-06

## 2021-05-05 RX ADMIN — SIMVASTATIN 40 MILLIGRAM(S): 20 TABLET, FILM COATED ORAL at 21:27

## 2021-05-05 RX ADMIN — LEVETIRACETAM 750 MILLIGRAM(S): 250 TABLET, FILM COATED ORAL at 16:32

## 2021-05-05 RX ADMIN — WARFARIN SODIUM 2 MILLIGRAM(S): 2.5 TABLET ORAL at 21:26

## 2021-05-05 RX ADMIN — Medication 3 MILLIGRAM(S): at 21:26

## 2021-05-05 RX ADMIN — CARVEDILOL PHOSPHATE 6.25 MILLIGRAM(S): 80 CAPSULE, EXTENDED RELEASE ORAL at 16:32

## 2021-05-05 RX ADMIN — Medication 112 MICROGRAM(S): at 05:33

## 2021-05-05 RX ADMIN — LEVETIRACETAM 750 MILLIGRAM(S): 250 TABLET, FILM COATED ORAL at 05:33

## 2021-05-05 NOTE — DISCHARGE NOTE PROVIDER - NSDCCPCAREPLAN_GEN_ALL_CORE_FT
PRINCIPAL DISCHARGE DIAGNOSIS  Diagnosis: Atlantoaxial subluxation, initial encounter  Assessment and Plan of Treatment: Follow up: Please call and make an appointment with neurosurgeon Dr. Real 1-2 weeks after discharge. Also, please call and make an appointment with your primary care physician as per your usual schedule.   Activity: Cervical collar is to be worn AT ALL TIMES.   Diet: Continue soft diet.  Medications: Please resume all home medications (see below for specific instructions regarding coumadin). Tylenol for pain relief, as needed  Patient is advised to RETURN TO THE EMERGENCY DEPARTMENT for any of the following - worsening pain, fever/chills, nausea/vomiting, altered mental status, chest pain, shortness of breath, or any other new / worsening symptom.      SECONDARY DISCHARGE DIAGNOSES  Diagnosis: Supratherapeutic INR  Assessment and Plan of Treatment: Please continue taking coumadin daily at current dose written on your discharge paperwork. It is EXTREMELY important that you follow-up with primary care provider and/or your cardiologist IMMEDIATELY after discharge for frequent INR checks and coumadin dosing.  INR should also be checked DAILY WHILE AT REHAB and coumadin dose adjusted appropriately. If you do not follow-up immediately and your INR becomes supra-therapeutic or sub-therapeutic, you risk bleeding or forming clots, respectively. INR goal for mechanical aortic valve = 2-3.    Diagnosis: JERSON (acute kidney injury)  Assessment and Plan of Treatment: Please follow-up with your primary care provider after discharge for monitoring of kidney function & further management.     PRINCIPAL DISCHARGE DIAGNOSIS  Diagnosis: Atlantoaxial subluxation, initial encounter  Assessment and Plan of Treatment: Follow up: Please call and make an appointment with neurosurgeon Dr. Real 1-2 weeks after discharge. Also, please call and make an appointment with your primary care physician as per your usual schedule.   Activity: Cervical collar is to be worn AT ALL TIMES.   Diet: Continue soft diet.  Medications: Please resume all home medications (see below for specific instructions regarding coumadin). Tylenol for pain relief, as needed  Patient is advised to RETURN TO THE EMERGENCY DEPARTMENT for any of the following - worsening pain, fever/chills, nausea/vomiting, altered mental status, chest pain, shortness of breath, or any other new / worsening symptom.      SECONDARY DISCHARGE DIAGNOSES  Diagnosis: H/O mechanical aortic valve replacement  Assessment and Plan of Treatment: ** Continue COUMADIN at 2MG DAILY. Goal INR = 2-3. **  ** THERAPEUTIC LOVENOX INJECTIONS 65mg every 12 hours should be continued IN ADDITION TO COUMADIN until INR is within range of 2-3 **  ** ONCE INR is 2-3, THERAPEUTIC LOVENOX SHOULD BE STOPPED AND COUMADIN CONTINUED **  ** INR SHOULD BE CHECKED DAILY and coumadin dose can be adjusted at the discretion of your providers at your rehab facility **  ** It is extremely important you follow-up with your primary care provider or cardiologist after discharge for further management **    Diagnosis: JERSON (acute kidney injury)  Assessment and Plan of Treatment: Please follow-up with your primary care provider after discharge for monitoring of kidney function & further management.

## 2021-05-05 NOTE — DISCHARGE NOTE PROVIDER - CARE PROVIDER_API CALL
Javon Real)  Neurosurgery  [address] [place] [address]  Phone: [phone]  Fax: [phone]  Follow Up Time:

## 2021-05-05 NOTE — PROGRESS NOTE ADULT - SUBJECTIVE AND OBJECTIVE BOX
SUBJECTIVE / 24H EVENTS: Patient seen and examined at bedside. No acute events overnight. Aside from mild discomfort from cervical collar pt with no acute complaints. Continues to report mild upper extremity paresthesias which she states have been present for "months" prior to her fall and are no worse or different at this time. Yesterday pt noted to have worsening JERSON. Was kept on IVF & off all nephrotoxic medications. INR continues to downtrend - yesterday 3.89. Today's AM labs pending. Pt is tolerating soft diet, OOB w/ physical therapy, pain controlled. Denies fever, chills, CP or SOB.     MEDICATIONS  (STANDING):  carvedilol 6.25 milliGRAM(s) Oral every 12 hours  insulin lispro (ADMELOG) corrective regimen sliding scale   SubCutaneous Before meals and at bedtime  levETIRAcetam 750 milliGRAM(s) Oral two times a day  levothyroxine 112 MICROGram(s) Oral daily  melatonin 3 milliGRAM(s) Oral at bedtime  multiple electrolytes Injection Type 1 1000 milliLiter(s) (75 mL/Hr) IV Continuous <Continuous>  senna 2 Tablet(s) Oral at bedtime  simvastatin 40 milliGRAM(s) Oral at bedtime    MEDICATIONS  (PRN):  acetaminophen   Tablet .. 650 milliGRAM(s) Oral every 6 hours PRN Temp greater or equal to 38C (100.4F), Mild Pain (1 - 3)  magnesium hydroxide Suspension 30 milliLiter(s) Oral daily PRN Constipation      Vital Signs Last 24 Hrs  T(C): 36.4 (05 May 2021 04:38), Max: 37.1 (04 May 2021 16:00)  T(F): 97.5 (05 May 2021 04:38), Max: 98.7 (04 May 2021 16:00)  HR: 60 (05 May 2021 04:38) (60 - 80)  BP: 114/66 (05 May 2021 04:38) (114/66 - 135/72)  BP(mean): --  RR: 18 (05 May 2021 04:38) (17 - 19)  SpO2: 97% (05 May 2021 04:38) (94% - 97%)    Constitutional: patient appears comfortable lying in bed, in NAD, calm & cooperative w/ exam  Respiratory: respirations are unlabored, no accessory muscle use, no conversational dyspnea  Cardiovascular: regular rate & rhythm  Gastrointestinal: abdomen soft, non-tender, non-distended, no rebound tenderness / guarding  : indwelling philippe remains in place, urine concentrated, no hematuria  Neurological: GCS: 15 (4/5/6). A&O x 3; no gross sensory / motor / coordination deficits, cervical collar remains in place  Psychiatric: Normal mood, normal affect      I&O's Detail    03 May 2021 07:01  -  04 May 2021 07:00  --------------------------------------------------------  IN:    IV PiggyBack: 50 mL    IV PiggyBack: 250 mL    IV PiggyBack: 100 mL    multiple electrolytes Injection Type 1.: 1800 mL    Oral Fluid: 300 mL  Total IN: 2500 mL    OUT:    Indwelling Catheter - Urethral (mL): 1300 mL  Total OUT: 1300 mL    Total NET: 1200 mL      04 May 2021 07:01  -  05 May 2021 06:56  --------------------------------------------------------  IN:    multiple electrolytes Injection Type 1.: 900 mL  Total IN: 900 mL    OUT:  Total OUT: 0 mL    Total NET: 900 mL          LABS:                        9.8    6.24  )-----------( 107      ( 04 May 2021 08:45 )             29.2     05-04    137  |  100  |  56.0<H>  ----------------------------<  103<H>  4.5   |  27.0  |  1.40<H>    Ca    8.3<L>      04 May 2021 08:45  Phos  3.0     05-04  Mg     2.4     05-04      PT/INR - ( 04 May 2021 08:45 )   PT: 42.3 sec;   INR: 3.89 ratio         PTT - ( 04 May 2021 08:45 )  PTT:43.5 sec  Urinalysis Basic - ( 04 May 2021 13:35 )    Color: Pale Yellow / Appearance: Slightly Turbid / S.005 / pH: x  Gluc: x / Ketone: Negative  / Bili: Negative / Urobili: Negative mg/dL   Blood: x / Protein: 30 mg/dL / Nitrite: Negative   Leuk Esterase: Moderate / RBC: 3-5 /HPF / WBC >50   Sq Epi: x / Non Sq Epi: Occasional / Bacteria: Moderate

## 2021-05-05 NOTE — PROGRESS NOTE ADULT - ASSESSMENT
87 y/o female s/p fall with atlantoaxial subluxation. Unable to have MRI performed due to incompatible pacemaker - neurosurgical plan is cervical collar @ all times. Patient with no neurological deficits. Pain controlled. Had worsening JERSON on yesterday's labs. Nephrotoxic meds off, kept on IVF. Also monitoring INR which was supratherapeutic on admission.   - C-collar to remain in place at all times, no surgical intervention being planned by neurosurgical team  - F/u AM labs, specifically kidney fxn & trend INR  - Once INR within therapeutic range, will restart coumadin for pt's mechanical aortic valve  - If Cr improving, will consider d/c philippe & TOV  - Continue work with PT & OT - likely disposition is JOVANNI  - Continue soft diet   - Pain control PRN - avoid narcotics  - Bowel regimen w/ senna & milk of mag  - DVT ppx w/ b/l SCDs  - Incentive spirometer for pulm toilette  - Discharge planning pending improvement of kidney fxn & improvement of INR 87 y/o female s/p fall with atlantoaxial subluxation. Unable to have MRI performed due to incompatible pacemaker - neurosurgical plan is cervical collar @ all times. Patient with no neurological deficits. Pain controlled. Had worsening JERSON on yesterday's labs. Nephrotoxic meds off, kept on IVF. Also monitoring INR which was supratherapeutic on admission.   - C-collar to remain in place at all times, no surgical intervention being planned by neurosurgical team  - F/u AM labs, specifically kidney fxn & trend INR  - Today started on Coumadin after INR 2.2, goal is 2-3 per cardiology, will continue with 2mg and if infratherapeutic will start therapeuti lovenox  - Creatinine improving, will do a TOV today  - Continue work with PT & OT - likely disposition is JOVANNI  - Continue soft diet   - Pain control PRN - avoid narcotics  - Bowel regimen w/ senna & milk of mag  - DVT ppx w/ b/l SCDs  - Incentive spirometer for pulm toilette  - Discharge planning pending improvement of kidney fxn & improvement of INR

## 2021-05-05 NOTE — DISCHARGE NOTE PROVIDER - NSFOLLOWUPCLINICS_GEN_ALL_ED_FT
[facility]  Cardiology  [address], [address]  [place] [address]  Phone: [phone]  Fax:

## 2021-05-05 NOTE — DISCHARGE NOTE PROVIDER - NSDCMRMEDTOKEN_GEN_ALL_CORE_FT
carvedilol 6.25 mg oral tablet: 1 tab(s) orally 2 times a day  Coumadin 3 mg oral tablet: 1 tab(s) orally once a day  ergocalciferol 50,000 intl units (1.25 mg) oral capsule: 1 cap(s) orally once a week  folic acid 1 mg oral tablet: 1 tab(s) orally once a day  furosemide 40 mg oral tablet: 1 tab(s) orally once a day  levETIRAcetam 750 mg oral tablet: 1 tab(s) orally 2 times a day  levothyroxine 112 mcg (0.112 mg) oral tablet: 1 tab(s) orally once a day  metFORMIN 500 mg oral tablet: 1 tab(s) orally 2 times a day  Potassium Chloride (Eqv-K-Tab) 10 mEq oral tablet, extended release: 1 tab(s) orally once a day  simvastatin 40 mg oral tablet: 1 tab(s) orally once a day (at bedtime)   acetaminophen 325 mg oral tablet: 2 tab(s) orally every 6 hours, As needed, Temp greater or equal to 38C (100.4F), Mild Pain (1 - 3)  carvedilol 6.25 mg oral tablet: 1 tab(s) orally 2 times a day  enoxaparin: 65 milligram(s) subcutaneous every 12 hours. ONCE INR IS WITHIN THERAPEUTIC RANGE (2-3), LOVENOX SHOULD BE DISCONTINUED AND PT SHOULD CONTINUE ON COUMADIN ONLY  ergocalciferol 50,000 intl units (1.25 mg) oral capsule: 1 cap(s) orally once a week  folic acid 1 mg oral tablet: 1 tab(s) orally once a day  furosemide 40 mg oral tablet: 1 tab(s) orally once a day  levETIRAcetam 750 mg oral tablet: 1 tab(s) orally 2 times a day  levothyroxine 112 mcg (0.112 mg) oral tablet: 1 tab(s) orally once a day  magnesium hydroxide 8% oral suspension: 30 milliliter(s) orally once a day, As needed, Constipation  metFORMIN 500 mg oral tablet: 1 tab(s) orally 2 times a day  Potassium Chloride (Eqv-K-Tab) 10 mEq oral tablet, extended release: 1 tab(s) orally once a day  senna oral tablet: 2 tab(s) orally once a day (at bedtime)  simvastatin 40 mg oral tablet: 1 tab(s) orally once a day (at bedtime)  warfarin 2 mg oral tablet: 1 tab(s) orally once a day (at bedtime). INR SHOULD BE CHECKED DAILY AND DOSE OF COUMADIN ADJUSTED ACCORDINGLY. Goal INR = 2-3,

## 2021-05-05 NOTE — DISCHARGE NOTE PROVIDER - HOSPITAL COURSE
Admission HPI: 87yo F w PMH of arthritis, DM, HTN, cardiomyopathy s/p AICD placement, and biscusp aortic Valve s/p AVR (1993) on coumadin who presents after mechanical fall backwards, landing on head. Patient was transferring from her walker to her 's when he accidently let go and the walker slipped. Patient landed backwards onto head, but denies loss of consciousness. Complains or posterior head/ neck pain as well as lower back pain, Denies fevers, chills, chest pain, SOB, paralysis, headache, nausea, or vomiting. Of note, patient is [employer] does not want to receive blood products. INR supratherapeutic at 4.1, reportedly 5 on recent testing 2 days ago so has stopped coumadin for past 2 days.     CT head without acute traumatic injury. CT cspine showed atlantoaxial subluxation. CT angio of neck without evidence of BCVI. Patient also c/o R knee & hip pain. XRs and CTs of pelvis & R knee showed no acute fractures, severe OA of knee w/ small effusion. Patient was admitted to the trauma service & neurosx consulted - requested MRI of neck however confirmed w/ cardiology & device representative that pt's ICD was NOT MRI compatible so MRI unable to be obtained. Neurosx recommended non-op mgmt with cervical collar @ all times. Pt remains neurologically intact w/ no deficits.    Hospital course complicated by supratherapeutic INR for which pt's coumadin was held. INR downtrended. Once within therapeutic range (2-3 for mechanical aortic valve) coumadin restarted.      Hospital course complicated by acute kidney injury which improved w/ IVF.     Patient worked with PT and OT who recommended JOVANNI upon discharge. Pt tolerating diet, pain controlled, neuro intact, voiding & having bowel fxn. Stable for discharge w/ outpatient follow-up.    Patient is advised to RETURN TO THE EMERGENCY DEPARTMENT for any of the following - worsening pain, fever/chills, nausea/vomiting, altered mental status, chest pain, shortness of breath, or any other new / worsening symptom.    Length of time preparing discharge > 30 minutes Admission HPI: 87yo F w PMH of arthritis, DM, HTN, cardiomyopathy s/p AICD placement, and biscusp aortic Valve s/p AVR (1993) on coumadin who presents after mechanical fall backwards, landing on head. Patient was transferring from her walker to her 's when he accidently let go and the walker slipped. Patient landed backwards onto head, but denies loss of consciousness. Complains or posterior head/ neck pain as well as lower back pain, Denies fevers, chills, chest pain, SOB, paralysis, headache, nausea, or vomiting. Of note, patient is [employer] does not want to receive blood products. INR supratherapeutic at 4.1, reportedly 5 on recent testing 2 days ago so has stopped coumadin for past 2 days.     CT head without acute traumatic injury. CT cspine showed atlantoaxial subluxation. CT angio of neck without evidence of BCVI. Patient also c/o R knee & hip pain. XRs and CTs of pelvis & R knee showed no acute fractures, severe OA of knee w/ small effusion. Patient was admitted to the trauma service & neurosx consulted - requested MRI of neck however confirmed w/ cardiology & device representative that pt's ICD was NOT MRI compatible so MRI unable to be obtained. Neurosx recommended non-op mgmt with cervical collar @ all times. Pt remains neurologically intact w/ no deficits.    Hospital course complicated by supratherapeutic INR for which pt's coumadin was held. INR downtrended. Once within therapeutic range (2-3 for mechanical aortic valve) coumadin restarted. INR then became subtherapeutic, so patient was started on therapeutic lovenox injections to bridge her until INR within 2-3 range. She will  need to continue therapeutic lovenox in addition to coumadin until INR between 2-3 at which time it should be discontinued.      Hospital course complicated by acute kidney injury which improved w/ IVF.     Patient worked with PT and OT who recommended JOVANNI upon discharge. Pt tolerating diet, pain controlled, neuro intact, voiding & having bowel fxn. Stable for discharge w/ outpatient follow-up.    Patient is advised to RETURN TO THE EMERGENCY DEPARTMENT for any of the following - worsening pain, fever/chills, nausea/vomiting, altered mental status, chest pain, shortness of breath, or any other new / worsening symptom.    Length of time preparing discharge > 30 minutes

## 2021-05-05 NOTE — PROGRESS NOTE ADULT - ATTENDING COMMENTS
clicks from aortic valve opening and closing  no gluteal or pedal edema  pain with moving right hip and right knee      atlantoaxial subluxation  -c-collar    prerenal JERSON resolving  -stop IV fluids  -remove Cruz catheter    mechanical AVR  admitted with supra-therapeutic INR  -restart coumadin 2 mg daily (home dose was 3 mg daily)  -start therapeutic LMWH is INR < 2    right hip pain  -CT pelvis (5/1/2021) - severe osteoarthritis of right hip    right knee pain  -CT right knee (5/2/2021) - severe osteoarthritis of right knee with small joint effusion    plan  -transfer to [facility] if GFR stable off IV fluids seen and examined 05-05-21 @ 0832    clicks from aortic valve opening and closing  no gluteal or pedal edema  pain with moving right hip and right knee      atlantoaxial subluxation  -c-collar    prerenal JERSON resolving  -stop IV fluids  -remove Cruz catheter    mechanical AVR  admitted with supra-therapeutic INR  -restart coumadin 2 mg daily (home dose was 3 mg daily)  -start therapeutic LMWH is INR < 2    right hip pain  -CT pelvis (5/1/2021) - severe osteoarthritis of right hip    right knee pain  -CT right knee (5/2/2021) - severe osteoarthritis of right knee with small joint effusion    plan  -transfer to [facility] if GFR stable off IV fluids

## 2021-05-06 ENCOUNTER — TRANSCRIPTION ENCOUNTER (OUTPATIENT)
Age: 86
End: 2021-05-06

## 2021-05-06 ENCOUNTER — NON-APPOINTMENT (OUTPATIENT)
Age: 86
End: 2021-05-06

## 2021-05-06 VITALS
HEART RATE: 76 BPM | SYSTOLIC BLOOD PRESSURE: 133 MMHG | RESPIRATION RATE: 18 BRPM | OXYGEN SATURATION: 94 % | DIASTOLIC BLOOD PRESSURE: 69 MMHG | TEMPERATURE: 98 F

## 2021-05-06 LAB
ANION GAP SERPL CALC-SCNC: 12 MMOL/L — SIGNIFICANT CHANGE UP (ref 5–17)
APTT BLD: 32.6 SEC — SIGNIFICANT CHANGE UP (ref 27.5–35.5)
BUN SERPL-MCNC: 41 MG/DL — HIGH (ref 8–20)
CALCIUM SERPL-MCNC: 8.6 MG/DL — SIGNIFICANT CHANGE UP (ref 8.6–10.2)
CHLORIDE SERPL-SCNC: 100 MMOL/L — SIGNIFICANT CHANGE UP (ref 98–107)
CO2 SERPL-SCNC: 27 MMOL/L — SIGNIFICANT CHANGE UP (ref 22–29)
CREAT SERPL-MCNC: 1.13 MG/DL — SIGNIFICANT CHANGE UP (ref 0.5–1.3)
GLUCOSE BLDC GLUCOMTR-MCNC: 147 MG/DL — HIGH (ref 70–99)
GLUCOSE BLDC GLUCOMTR-MCNC: 166 MG/DL — HIGH (ref 70–99)
GLUCOSE SERPL-MCNC: 127 MG/DL — HIGH (ref 70–99)
INR BLD: 1.56 RATIO — HIGH (ref 0.88–1.16)
MAGNESIUM SERPL-MCNC: 1.9 MG/DL — SIGNIFICANT CHANGE UP (ref 1.6–2.6)
PHOSPHATE SERPL-MCNC: 4.1 MG/DL — SIGNIFICANT CHANGE UP (ref 2.4–4.7)
POTASSIUM SERPL-MCNC: 4.5 MMOL/L — SIGNIFICANT CHANGE UP (ref 3.5–5.3)
POTASSIUM SERPL-SCNC: 4.5 MMOL/L — SIGNIFICANT CHANGE UP (ref 3.5–5.3)
PROTHROM AB SERPL-ACNC: 17.7 SEC — HIGH (ref 10.6–13.6)
SODIUM SERPL-SCNC: 139 MMOL/L — SIGNIFICANT CHANGE UP (ref 135–145)

## 2021-05-06 PROCEDURE — 96375 TX/PRO/DX INJ NEW DRUG ADDON: CPT

## 2021-05-06 PROCEDURE — U0003: CPT

## 2021-05-06 PROCEDURE — 83935 ASSAY OF URINE OSMOLALITY: CPT

## 2021-05-06 PROCEDURE — 73562 X-RAY EXAM OF KNEE 3: CPT

## 2021-05-06 PROCEDURE — 82570 ASSAY OF URINE CREATININE: CPT

## 2021-05-06 PROCEDURE — 99233 SBSQ HOSP IP/OBS HIGH 50: CPT

## 2021-05-06 PROCEDURE — 70450 CT HEAD/BRAIN W/O DYE: CPT

## 2021-05-06 PROCEDURE — 86769 SARS-COV-2 COVID-19 ANTIBODY: CPT

## 2021-05-06 PROCEDURE — 72128 CT CHEST SPINE W/O DYE: CPT

## 2021-05-06 PROCEDURE — 84100 ASSAY OF PHOSPHORUS: CPT

## 2021-05-06 PROCEDURE — 70498 CT ANGIOGRAPHY NECK: CPT

## 2021-05-06 PROCEDURE — 99285 EMERGENCY DEPT VISIT HI MDM: CPT | Mod: 25

## 2021-05-06 PROCEDURE — 72192 CT PELVIS W/O DYE: CPT

## 2021-05-06 PROCEDURE — 97163 PT EVAL HIGH COMPLEX 45 MIN: CPT

## 2021-05-06 PROCEDURE — 80048 BASIC METABOLIC PNL TOTAL CA: CPT

## 2021-05-06 PROCEDURE — 73700 CT LOWER EXTREMITY W/O DYE: CPT

## 2021-05-06 PROCEDURE — 96374 THER/PROPH/DIAG INJ IV PUSH: CPT

## 2021-05-06 PROCEDURE — 86901 BLOOD TYPING SEROLOGIC RH(D): CPT

## 2021-05-06 PROCEDURE — 85025 COMPLETE CBC W/AUTO DIFF WBC: CPT

## 2021-05-06 PROCEDURE — 99239 HOSP IP/OBS DSCHRG MGMT >30: CPT

## 2021-05-06 PROCEDURE — 97167 OT EVAL HIGH COMPLEX 60 MIN: CPT

## 2021-05-06 PROCEDURE — 36415 COLL VENOUS BLD VENIPUNCTURE: CPT

## 2021-05-06 PROCEDURE — 82962 GLUCOSE BLOOD TEST: CPT

## 2021-05-06 PROCEDURE — 83036 HEMOGLOBIN GLYCOSYLATED A1C: CPT

## 2021-05-06 PROCEDURE — 85610 PROTHROMBIN TIME: CPT

## 2021-05-06 PROCEDURE — 86850 RBC ANTIBODY SCREEN: CPT

## 2021-05-06 PROCEDURE — U0005: CPT

## 2021-05-06 PROCEDURE — 71045 X-RAY EXAM CHEST 1 VIEW: CPT

## 2021-05-06 PROCEDURE — 86900 BLOOD TYPING SEROLOGIC ABO: CPT

## 2021-05-06 PROCEDURE — 85730 THROMBOPLASTIN TIME PARTIAL: CPT

## 2021-05-06 PROCEDURE — 83735 ASSAY OF MAGNESIUM: CPT

## 2021-05-06 PROCEDURE — 72131 CT LUMBAR SPINE W/O DYE: CPT

## 2021-05-06 PROCEDURE — 87086 URINE CULTURE/COLONY COUNT: CPT

## 2021-05-06 PROCEDURE — 72125 CT NECK SPINE W/O DYE: CPT

## 2021-05-06 PROCEDURE — C8929: CPT

## 2021-05-06 PROCEDURE — 80053 COMPREHEN METABOLIC PANEL: CPT

## 2021-05-06 PROCEDURE — 96376 TX/PRO/DX INJ SAME DRUG ADON: CPT

## 2021-05-06 PROCEDURE — 73590 X-RAY EXAM OF LOWER LEG: CPT

## 2021-05-06 PROCEDURE — 84300 ASSAY OF URINE SODIUM: CPT

## 2021-05-06 PROCEDURE — 81001 URINALYSIS AUTO W/SCOPE: CPT

## 2021-05-06 RX ORDER — WARFARIN SODIUM 2.5 MG/1
1 TABLET ORAL
Qty: 0 | Refills: 0 | DISCHARGE
Start: 2021-05-06

## 2021-05-06 RX ORDER — ACETAMINOPHEN 500 MG
2 TABLET ORAL
Qty: 0 | Refills: 0 | DISCHARGE
Start: 2021-05-06

## 2021-05-06 RX ORDER — ENOXAPARIN SODIUM 100 MG/ML
65 INJECTION SUBCUTANEOUS EVERY 12 HOURS
Refills: 0 | Status: DISCONTINUED | OUTPATIENT
Start: 2021-05-06 | End: 2021-05-06

## 2021-05-06 RX ORDER — ENOXAPARIN SODIUM 100 MG/ML
65 INJECTION SUBCUTANEOUS
Qty: 0 | Refills: 0 | DISCHARGE
Start: 2021-05-06

## 2021-05-06 RX ORDER — MAGNESIUM HYDROXIDE 400 MG/1
30 TABLET, CHEWABLE ORAL
Qty: 0 | Refills: 0 | DISCHARGE
Start: 2021-05-06

## 2021-05-06 RX ORDER — SENNA PLUS 8.6 MG/1
2 TABLET ORAL
Qty: 0 | Refills: 0 | DISCHARGE
Start: 2021-05-06

## 2021-05-06 RX ADMIN — Medication 112 MICROGRAM(S): at 05:25

## 2021-05-06 RX ADMIN — CARVEDILOL PHOSPHATE 6.25 MILLIGRAM(S): 80 CAPSULE, EXTENDED RELEASE ORAL at 05:25

## 2021-05-06 RX ADMIN — LEVETIRACETAM 750 MILLIGRAM(S): 250 TABLET, FILM COATED ORAL at 05:25

## 2021-05-06 NOTE — PROGRESS NOTE ADULT - ATTENDING COMMENTS
clicks from aortic valve opening and closing  no gluteal or pedal edema  pain with moving right hip and right knee      atlantoaxial subluxation  -c-collar    prerenal JERSON resolving  -GFR continues to improve after stopping IV fluids on 5/5  -passed trial of void after removing Cruz catheter on 5/5    mechanical AVR  admitted with supra-therapeutic INR (home dose of coumadin was 3 mg)  -coumadin 2 mg daily (5/5 - ?)  -INR = 1.5, so start therapeutic LMWH until INR > 2    right hip pain  -CT pelvis (5/1/2021) - severe osteoarthritis of right hip    right knee pain  -CT right knee (5/2/2021) - severe osteoarthritis of right knee with small joint effusion    plan  -clear for transfer to [facility] seen and examined 05-06-21 @ 0800    clicks from aortic valve opening and closing  no gluteal or pedal edema  pain with moving right hip and right knee      atlantoaxial subluxation  -c-collar    prerenal JERSON resolving  -GFR continues to improve after stopping IV fluids on 5/5  -passed trial of void after removing Cruz catheter on 5/5    mechanical AVR  admitted with supra-therapeutic INR (home dose of coumadin was 3 mg)  -coumadin 2 mg daily (5/5 - ?)  -INR = 1.5, so start therapeutic LMWH until INR > 2    right hip pain  -CT pelvis (5/1/2021) - severe osteoarthritis of right hip    right knee pain  -CT right knee (5/2/2021) - severe osteoarthritis of right knee with small joint effusion    plan  -clear for transfer to [facility]

## 2021-05-06 NOTE — PROGRESS NOTE ADULT - ASSESSMENT
87 y/o female s/p fall with atlantoaxial subluxation. Unable to have MRI performed due to incompatible pacemaker - neurosurgical plan is cervical collar @ all times. Patient with no neurological deficits. Pain controlled. Had worsening JERSON on yesterday's labs. Nephrotoxic meds off, kept on IVF. Also monitoring INR which was supratherapeutic on admission.   - C-collar to remain in place at all times, no surgical intervention being planned by neurosurgical team  - F/u AM labs, specifically kidney fxn & trend INR  - Today started on Coumadin after INR 2.2, goal is 2-3 per cardiology, will continue with 2mg and if INR is sub therapeutic this am will start therapeutic  lovenox  - Passed  TOV   - Continue work with PT & OT - awaiting JOVANNI placement   - Continue soft diet   - Pain control PRN - avoid narcotics  - Bowel regimen w/ senna & milk of mag  - DVT ppx w/ b/l

## 2021-05-06 NOTE — OCCUPATIONAL THERAPY INITIAL EVALUATION ADULT - BATHING, PREVIOUS LEVEL OF FUNCTION, OT EVAL
Tub with multiple grab bars, built in seat.  "The seat is too low to use though."/needs device and assist

## 2021-05-06 NOTE — OCCUPATIONAL THERAPY INITIAL EVALUATION ADULT - LEVEL OF INDEPENDENCE: DRESS LOWER BODY, OT EVAL
supine in bed - limited by pain and fatigue at time of eval/dependent (less than 25% patients effort)

## 2021-05-06 NOTE — OCCUPATIONAL THERAPY INITIAL EVALUATION ADULT - GROSSLY INTACT, SENSORY
mild impairment bilaterally.  Pt reports intermittent numbness bilateral hands at baseline./Left UE/Right UE

## 2021-05-06 NOTE — OCCUPATIONAL THERAPY INITIAL EVALUATION ADULT - LEVEL OF INDEPENDENCE: SUPINE/SIT, REHAB EVAL
Assess - pt firmly declined at time of eval due to neck pain starting to get under control after AM care supine./unable to perform

## 2021-05-06 NOTE — OCCUPATIONAL THERAPY INITIAL EVALUATION ADULT - FINE MOTOR COORDINATION EXAM
minimal impairment bilateral UE - intermittent numbness bilaterally.  Pt reports difficulty managing fasteners on clothing, opening and closing task items and compensates independently - asks for assist as needed.

## 2021-05-06 NOTE — OCCUPATIONAL THERAPY INITIAL EVALUATION ADULT - RANGE OF MOTION EXAMINATION, UPPER EXTREMITY
bilateral shoulders limited to 90 flexion, abduction.  Pt however able to demonstrate functional use bilateral UEs for self care./bilateral UE Active ROM was WFL  (within functional limits)

## 2021-05-06 NOTE — OCCUPATIONAL THERAPY INITIAL EVALUATION ADULT - NS ASR OT EQUIP NEEDS DISCH
JOVANNI Myers already has the following equipment: RW, commode, grab bars in tub, sock aide, reacher.  Pt reports the commode and reacher are very "rickety."

## 2021-05-06 NOTE — PROGRESS NOTE ADULT - NUTRITIONAL ASSESSMENT
This patient has been assessed with a concern for Malnutrition and has been determined to have a diagnosis/diagnoses of Severe protein-calorie malnutrition.    This patient is being managed with:   Diet Mechanical Soft-  Consistent Carbohydrate {Evening Snack} (CSTCHOSN)  Supplement Feeding Modality:  Oral  Glucerna Shake Cans or Servings Per Day:  3       Frequency:  Three Times a day  Entered: May  4 2021  9:44AM    
This patient has been assessed with a concern for Malnutrition and has been determined to have a diagnosis/diagnoses of Severe protein-calorie malnutrition.    This patient is being managed with:   Diet Mechanical Soft-  Consistent Carbohydrate {Evening Snack} (CSTCHOSN)  Supplement Feeding Modality:  Oral  Glucerna Shake Cans or Servings Per Day:  3       Frequency:  Three Times a day  Entered: May  4 2021  9:44AM

## 2021-05-06 NOTE — OCCUPATIONAL THERAPY INITIAL EVALUATION ADULT - ORIENTATION, REHAB EVAL
Pt reports "we are in a nursing home" but easily reoriented by this therapist.  Pt states date accurately and that her spouse is supposed to be in nursing home facility with her./person/time/situation

## 2021-05-06 NOTE — PROGRESS NOTE ADULT - SUBJECTIVE AND OBJECTIVE BOX
SUBJECTIVE/24 hour events:  Patient is a 86yFemale s/p fall sustaining an atlantoaxial subluxation and must wear c-collar at all time for 6 weeks. Patient with no acute events overnight, passed TOV, kit resolving, INR now within therapeutic levels and coumadin restarted, if today it falls below 2 start therapeutic lovenox to bridge. Patient awaiting JOVANNI placement       Vital Signs Last 24 Hrs  T(C): 37.2 (05 May 2021 16:22), Max: 37.2 (05 May 2021 16:22)  T(F): 99 (05 May 2021 16:22), Max: 99 (05 May 2021 16:22)  HR: 74 (05 May 2021 16:22) (59 - 74)  BP: 134/68 (05 May 2021 16:22) (113/55 - 134/68)  BP(mean): --  RR: 20 (05 May 2021 16:22) (18 - 20)  SpO2: 93% (05 May 2021 16:22) (93% - 97%)  Drug Dosing Weight  Height (cm): 153.9 (01 May 2021 23:33)  Weight (kg): 67 (01 May 2021 17:34)  BMI (kg/m2): 28.3 (01 May 2021 23:33)  BSA (m2): 1.65 (01 May 2021 23:33)  I&O's Detail    04 May 2021 07:01  -  05 May 2021 07:00  --------------------------------------------------------  IN:    multiple electrolytes Injection Type 1.: 900 mL  Total IN: 900 mL    OUT:  Total OUT: 0 mL    Total NET: 900 mL      05 May 2021 07:01  -  06 May 2021 03:13  --------------------------------------------------------  IN:  Total IN: 0 mL    OUT:    Voided (mL): 1000 mL  Total OUT: 1000 mL    Total NET: -1000 mL        Allergies    iodine (Urticaria; Rash; Hives)    Intolerances                              9.9    6.33  )-----------( 117      ( 05 May 2021 07:24 )             30.1   05-05    141  |  102  |  51.0<H>  ----------------------------<  117<H>  4.3   |  29.0  |  1.34<H>    Ca    8.7      05 May 2021 07:24  Phos  3.5     05-05  Mg     2.2     05-05    PT/INR - ( 05 May 2021 07:24 )   PT: 25.5 sec;   INR: 2.29 ratio         PTT - ( 04 May 2021 08:45 )  PTT:43.5 sec    ROS:    PHYSICAL EXAM:  Constitutional: " im so lonely here"    Respiratory: no respiratory distress, no dyspnea, no supplemental o2 needed     Gastrointestinal: abdomen soft, non-tender, atraumatic     Genitourinary: voiding spontaneously     Neurological: A&OX3, GCS 15, c-collar in place     Skin: warm, dry and no rashes      MEDICATIONS  (STANDING):  carvedilol 6.25 milliGRAM(s) Oral every 12 hours  insulin lispro (ADMELOG) corrective regimen sliding scale   SubCutaneous Before meals and at bedtime  levETIRAcetam 750 milliGRAM(s) Oral two times a day  levothyroxine 112 MICROGram(s) Oral daily  melatonin 3 milliGRAM(s) Oral at bedtime  senna 2 Tablet(s) Oral at bedtime  simvastatin 40 milliGRAM(s) Oral at bedtime  warfarin 2 milliGRAM(s) Oral at bedtime    MEDICATIONS  (PRN):  acetaminophen   Tablet .. 650 milliGRAM(s) Oral every 6 hours PRN Temp greater or equal to 38C (100.4F), Mild Pain (1 - 3)  magnesium hydroxide Suspension 30 milliLiter(s) Oral daily PRN Constipation      RADIOLOGY STUDIES:    CULTURES:

## 2021-05-06 NOTE — OCCUPATIONAL THERAPY INITIAL EVALUATION ADULT - LIVES WITH, PROFILE
"We assist one another."  As per EMR, pt's spouse has [place] and is unable to provide much physical assist.  Pt reports all children are out of state ([place], [place], [place])/spouse

## 2021-05-06 NOTE — OCCUPATIONAL THERAPY INITIAL EVALUATION ADULT - LOWER BODY DRESSING, PREVIOUS LEVEL OF FUNCTION, OT EVAL
"He helps me hike up my pants.  I only wear socks on my feet and use a sock aide."/needs device and assist

## 2021-05-06 NOTE — PROGRESS NOTE ADULT - REASON FOR ADMISSION
Atlantoaxial Subluxation

## 2021-05-06 NOTE — OCCUPATIONAL THERAPY INITIAL EVALUATION ADULT - ADDITIONAL COMMENTS
Pt lives in private home with ramp to enter.  Once inside there are 4 stairs, HR to den and laundry area.   Pt relies on spouse for community mobility.  Pt already has RW, NBQC, commode, grab bars in tub, sock aide and reacher.

## 2021-05-06 NOTE — DISCHARGE NOTE NURSING/CASE MANAGEMENT/SOCIAL WORK - PATIENT PORTAL LINK FT
You can access the FollowMyHealth Patient Portal offered by [facility] by registering at the following website: [email or web address]. By joining [facility]’s FollowMyHealth portal, you will also be able to view your health information using other applications (apps) compatible with our system.

## 2021-05-06 NOTE — OCCUPATIONAL THERAPY INITIAL EVALUATION ADULT - HOME MANAGEMENT SKILLS, PREVIOUS LEVEL OF FUNCTION, OT EVAL
Increased difficulty with meal prep and home management and community tasks./needs device and assist

## 2021-05-16 NOTE — CDI QUERY NOTE - NSCDIOTHERTXTBX_GEN_ALL_CORE_HH
In some progress notes it was written patient was well-nourished but at the same time with a diagnosis of malnutrition, can you please clarify this conflicting information?  A.	Severe protein calorie malnutrition (present on admission)  B.	No malnutrition patient is well-nourished  C.	Other, please specify  D.	Not clinically significant       Chart Notifications:    5/2/21 Consult Note Adult-Cardiology Nurse Practitioner/Attending  PHYSICAL EXAM:  Appearance: NAD, well nourished	      5/3/21 Progress Note Adult-Cardiology Nurse Practitioner/Attending  PHYSICAL EXAM:  Appearance: NAD, well nourished	      5/3/21 Dietitian Nutrition Risk Notification:  Upon Nutritional Assessment by the Registered Dietitian Your Patient was Determined to Meet Criteria/Has Evidence of the Following Diagnosis:	Severe protein-calorie malnutrition  Other Risk Factors	Not applicable  Etiology-Basis	Chronic illness  Malnutrition Evaluation as Demonstrated by (Adults > 20 years of age)	Loss of subcutaneous fat...  Loss of muscle...  Fluid accumulation...  Body Fat (loss of subcutaneous fat)	Orbital...  Buccal...  Orbital Depletion is	moderate  Buccal Depletion is	moderate  Muscle Mass (Loss of Muscle)	Temples...  Temples Depletion is	severe  Fluid Accumulation	Moderate edema

## 2021-05-17 NOTE — CHART NOTE - NSCHARTNOTESELECT_GEN_ALL_CORE
Event Note
Event Note
transfer note/Event Note
Chart note
SICU Downgrade Note/Event Note
TRAUMA TERTIARY EXAM/Event Note

## 2021-05-17 NOTE — CHART NOTE - NSCHARTNOTEFT_GEN_A_CORE
HPI/Interval Events: Patient downgraded from SICU. No acute intervening events. Patient feels well, with no significant complaints. Pain controlled. Tolerating PO. Cruz in place. Limited ambulation due to right knee pain. No f/c/n/v, chest pain, SOB.    MEDICATIONS  (STANDING):  carvedilol 6.25 milliGRAM(s) Oral every 12 hours  insulin lispro (ADMELOG) corrective regimen sliding scale   SubCutaneous Before meals and at bedtime  levETIRAcetam 750 milliGRAM(s) Oral two times a day  levothyroxine 112 MICROGram(s) Oral daily  melatonin 3 milliGRAM(s) Oral at bedtime  multiple electrolytes Injection Type 1 1000 milliLiter(s) (75 mL/Hr) IV Continuous <Continuous>  simvastatin 40 milliGRAM(s) Oral at bedtime    MEDICATIONS  (PRN):  acetaminophen   Tablet .. 650 milliGRAM(s) Oral every 6 hours PRN Temp greater or equal to 38C (100.4F), Mild Pain (1 - 3)      Vital Signs Last 24 Hrs  T(C): 36.4 (03 May 2021 18:22), Max: 37 (03 May 2021 15:17)  T(F): 97.6 (03 May 2021 18:22), Max: 98.6 (03 May 2021 15:17)  HR: 62 (03 May 2021 18:22) (60 - 65)  BP: 129/59 (03 May 2021 18:22) (91/77 - 129/59)  BP(mean): 65 (03 May 2021 17:00) (56 - 94)  RR: 18 (03 May 2021 18:22) (12 - 20)  SpO2: 90% (03 May 2021 18:22) (90% - 100%)    Gen: patient laying in bed, A&Ox3, NAD  HEENT: EOMI / PERRL b/l. C-collar in place  Pulm: regular respiratory rate, no distress  CV: RRR  GI: Abdomen soft, NTND  Neurological: no gross sensory / motor deficits  Ext: Warm, pink, no edema or lesions noted      I&O's Detail    02 May 2021 07:01  -  03 May 2021 07:00  --------------------------------------------------------  IN:    multiple electrolytes Injection Type 1.: 225 mL  Total IN: 225 mL    OUT:    Voided (mL): 850 mL  Total OUT: 850 mL    Total NET: -625 mL      03 May 2021 07:01  -  03 May 2021 19:51  --------------------------------------------------------  IN:    IV PiggyBack: 50 mL    IV PiggyBack: 250 mL    IV PiggyBack: 100 mL    multiple electrolytes Injection Type 1.: 900 mL  Total IN: 1300 mL    OUT:    Indwelling Catheter - Urethral (mL): 600 mL  Total OUT: 600 mL    Total NET: 700 mL          LABS:                        10.2   7.17  )-----------( 102      ( 03 May 2021 05:49 )             31.4     05-03    139  |  99  |  52.0<H>  ----------------------------<  92  4.2   |  29.0  |  1.37<H>    Ca    8.4<L>      03 May 2021 05:50  Phos  2.8     05-03  Mg     1.7     05-03      PT/INR - ( 03 May 2021 05:50 )   PT: 63.1 sec;   INR: 5.92 ratio         PTT - ( 03 May 2021 05:50 )  PTT:39.8 sec      A/P: 86y Female w/PMH of DM, HTN, cardiomyopathy s/p AICD placement, and mechanical MVR, admitted s/p fall and found to have atlantoaxial subluxation, JERSON, and supratherapeutic INR, now downgraded from SICU to floor. Stable on floor.    -Continue to hold Coumadin  -Monitor INR  -Will need C-collar at all times  -Monitor urine ouput and trend Cr  -F/U PT/OT on floor (PT recommending JOVANNI in SICU)
Please note upon review of chart the following diagnosis exists for this patient:      1.	Severe protein calorie malnutrition (present on admission)    Chart Notifications:  5/3/21 Dietitian Nutrition Risk Notification:  Upon Nutritional Assessment by the Registered Dietitian Your Patient was Determined to Meet Criteria/Has Evidence of the Following Diagnosis:	Severe protein-calorie malnutrition  Other Risk Factors	Not applicable  Etiology-Basis	Chronic illness  Malnutrition Evaluation as Demonstrated by (Adults > 20 years of age)	Loss of subcutaneous fat...  Loss of muscle...  Fluid accumulation...  Body Fat (loss of subcutaneous fat)	Orbital...  Buccal...  Orbital Depletion is	moderate  Buccal Depletion is	moderate  Muscle Mass (Loss of Muscle)	Temples...  Temples Depletion is	severe  Fluid Accumulation	Moderate edema.
Tertiary Trauma Survey (TTS)    Date of TTS: 05-05-21 @ 12:33                             Admit Date: 05-01-21 @ 16:51      Trauma Activation: Consult    Subjective / 24 hour events:  Patient is an 87 y/o female who was admitted on 5/1 s/p fall and found to have atlantoaxial subluxation. She was admitted to the trauma service & neurosx consulted. Neurosx recommended for MRI to be obtained however pt has ICD that is not compatible and because of this, neurosx has recommended non-op mgmt w/ c-collar @ all times. She was seen and examined by myself & Dr Bonilla this morning. She is complaining of moderate R knee & hip pain however upon review of imaging CTs were performed of both R knee and pelvis showing no acute fractures, small knee effusion. States she was having pain to these areas prior to fall and imaging also revealed severe OA of the knee. Working with PT and OT who recommend JOVANNI upon discharge. Of note, pt was admitted w/ supratherapeutic INR which is now within range, and developed JERSON which is improving w/ IVF    Vital Signs Last 24 Hrs  T(C): 37 (05 May 2021 11:41), Max: 37.1 (04 May 2021 16:00)  T(F): 98.6 (05 May 2021 11:41), Max: 98.7 (04 May 2021 16:00)  HR: 59 (05 May 2021 11:41) (59 - 65)  BP: 113/55 (05 May 2021 11:41) (113/55 - 122/65)  BP(mean): --  RR: 20 (05 May 2021 11:41) (17 - 20)  SpO2: 96% (05 May 2021 11:41) (94% - 97%)    Physical Exam:    Neuro: [x] non focal neurological exam - cervical collar in place [ ] Focal Neurological deficits noted to be:     HEENT: [x] Normo-cephalic/atraumatic  [ ] abnormalities noted to be:    Pulm/Chest:  [x] CTA b/l  [ ] chest wall non tender  [ ] abnormalities noted to be:    Cardiac: [x] S1S2, sinus rhythm  [ ] abnormalities noted to be:     GI / Abdomen: [x] Soft, non-tender, non-distended [ ] abnormalities noted to be:    Musculoskeletal / Extremities: [x]normal active ROM  [ ]  abnormalities noted to be    Integumentary: [x] Skin intact [x] Warm [x] Dry [ ]abnormalities noted to be:    Vascular: [x] 2+ palpable distal pulses  [ ] PETE:       [ ] abnormalities noted to be:      List Injuries Identified to Date: atlantoaxial dislocation    List Operative and Interventional Radiological Procedures:     Consults (Date):  [x] Neurosurgery   [  ] Orthopedics  [  ] Plastics  [  ] Urology  [x] PT/OT  [  ]PM&R  [  ] Social Work    RADIOLOGICAL FINDINGS REVIEW:  CXR: No gross consolidation is seen. Status post sternotomy. Pacemaker present.    Pelvis Films:     XR R knee:  Osteopenia. No gross fracture or dislocation is seen. Tricompartmental degenerative changes noted.  XR R tib fib: Limited study. Osteopenia. No gross fracture is seen.    C-Spine Films:    T/L/S Spine Films:    Extremity Films:    5/1 Head CT: scattered chronic ischemic changes with volume loss. Incidentally noted is upward migration of the odontoid with basilar invagination; no intracerebral hemorrhage, contusion, or extracerebral collections are identified.    5/1 C-Spine CT:  Atlantoaxial subluxation with upward migration of the odontoid and basilar invagination.     Neck CT:    Chest CT:    ABD/Pelvis CT:    5/1 CT Pelvis: No acute fracture or dislocation.    5/2 CT angio neck: No CT evidence of acute traumatic vascular injury involving the cervical carotid or vertebral arteries.    5/2 CT R knee: No acute fracture or dislocation. Small knee joint effusion. No lipohemarthrosis. Severe osteoarthritis of the right knee.      Assessment and Plan: 86 /yo female s/p fall with atlantoaxial subluxation. Being managed non-operative w/ c-collar @ all times. Remains neuro intact w/ no deficits. Also noted to have supratherapeutic INR  on admission which is now within range for mechanical aortic valve (2-3) and improving JERSON. No additional injuries identified on tertiary exam.   - Continue C-collar @ all times, outpatient follow-up with neurosx  - R knee & hip pain pain likely 2/2 severe osteoarthritis as CT scans demonstrate no fx - continue w/ pain control as needed, avoid narcotics if able  - Coumadin restarted for mechanical aortic valve, per cardiology goal INR 2-3. If INR falls <2, will bridge w/ therapeutic lovenox  - Monitor kidney fxn - improving on this AM's labs, will d/c IVF & encourage PO intake of fluids, repeat BMP in AM  - Cruz out - will f/u TOV & bladder scan after 1st void to record post-void residual volume  - Regular diet as tolerated w/ glucerna supplementation  - Continue work with PT & OT - likely [facility] to [facility] tomorrow 5/6
Reviewed patient's prior device interrogations.    The patient has a [employer] Viva XT CRT-D system which is not MRI compatible.
SICU TRANSFER NOTE  -----------------------------  ICU Admission Date: 5/1/21  Transfer Date: 05-03-21 @ 16:39    Admission Diagnosis: Atlantoaxial subluxation    Active Problems/injuries: Atlantoaxial subluxation    Procedures: n/a    Consultants:  [x] Cardiology  [ ] Endocrine  [ ] Infectious Disease  [ ] Medicine  [x] Neurosurgery  [ ] Ortho       [ ] Weight Bearing Status:  [x] Palliative       [x] Advanced Directives:  Full code  [x] Physical Medicine and Rehab       [x] Disposition : JOVANNI  [ ] Plastics  [ ] Pulmonary    Medications  acetaminophen   Tablet .. 650 milliGRAM(s) Oral every 6 hours PRN  carvedilol 6.25 milliGRAM(s) Oral every 12 hours  insulin lispro (ADMELOG) corrective regimen sliding scale   SubCutaneous Before meals and at bedtime  levETIRAcetam 750 milliGRAM(s) Oral two times a day  levothyroxine 112 MICROGram(s) Oral daily  magnesium hydroxide Suspension 30 milliLiter(s) Oral daily PRN  melatonin 3 milliGRAM(s) Oral at bedtime  multiple electrolytes Injection Type 1 1000 milliLiter(s) IV Continuous <Continuous>  senna 2 Tablet(s) Oral at bedtime  simvastatin 40 milliGRAM(s) Oral at bedtime      [x] I attest I have reviewed and reconciled all medications prior to transfer    IV Fluids     Indication: mild JERSON 1.3, continue IVF check AM labs     Antibiotics: None      I have discussed this case with Dr. Cipriano Chang upon transfer and all questions regarding ICU course were answered.  The following items are to be followed up:    Patient requires supervised meals, IVF for 24hrs and AM BMP, keep C-collar at all times, PT JOVANNI
Patient Selina New was fit and delivered a cervical orthosis occipital mandibular support with additional soft sleeve protection. Selina was educated on the care use and function of the orthosis, as well as proper donning and doffing procedures. Contact information was given to patient. All went without incident.   [facility]  [phone]

## 2022-09-22 ENCOUNTER — FORM ENCOUNTER (OUTPATIENT)
Age: 87
End: 2022-09-22

## 2023-01-20 LAB
APPEARANCE: ABNORMAL
APPEARANCE: ABNORMAL
BACTERIA UR CULT: ABNORMAL
BACTERIA UR CULT: NORMAL
BACTERIA UR CULT: NORMAL
BACTERIA: ABNORMAL
BACTERIA: ABNORMAL
BILIRUBIN URINE: NEGATIVE
BILIRUBIN URINE: NEGATIVE
BLOOD URINE: ABNORMAL
BLOOD URINE: ABNORMAL
COLOR: YELLOW
COLOR: YELLOW
CORE LAB FLUID CYTOLOGY: NORMAL
GLUCOSE QUALITATIVE U: NEGATIVE MG/DL
GLUCOSE QUALITATIVE U: NEGATIVE MG/DL
HYALINE CASTS: 0 /LPF
KETONES URINE: NEGATIVE
KETONES URINE: NEGATIVE
LEUKOCYTE ESTERASE URINE: ABNORMAL
LEUKOCYTE ESTERASE URINE: ABNORMAL
MICROSCOPIC-UA: NORMAL
MICROSCOPIC-UA: NORMAL
NITRITE URINE: NEGATIVE
NITRITE URINE: POSITIVE
PH URINE: 6
PH URINE: 6
PROTEIN URINE: NEGATIVE MG/DL
PROTEIN URINE: NEGATIVE MG/DL
RED BLOOD CELLS URINE: 1 /HPF
RED BLOOD CELLS URINE: 1 /HPF
SPECIFIC GRAVITY URINE: 1.01
SPECIFIC GRAVITY URINE: 1.01
SQUAMOUS EPITHELIAL CELLS: 0 /HPF
SQUAMOUS EPITHELIAL CELLS: 0 /HPF
UROBILINOGEN URINE: NEGATIVE MG/DL
UROBILINOGEN URINE: NEGATIVE MG/DL
WHITE BLOOD CELLS URINE: 282 /HPF
WHITE BLOOD CELLS URINE: 466 /HPF

## 2025-02-25 NOTE — OCCUPATIONAL THERAPY INITIAL EVALUATION ADULT - SHORT TERM MEMORY, REHAB EVAL
Problem: Chronic Conditions and Co-morbidities  Goal: Patient's chronic conditions and co-morbidity symptoms are monitored and maintained or improved  2/25/2025 1323 by Ana María Jean RN  Outcome: Completed  2/25/2025 0803 by Ana María Jean RN  Outcome: Progressing     Problem: Discharge Planning  Goal: Discharge to home or other facility with appropriate resources  2/25/2025 1323 by Ana María Jean RN  Outcome: Completed  2/25/2025 0803 by Ana María Jean RN  Outcome: Progressing     Problem: Pain  Goal: Verbalizes/displays adequate comfort level or baseline comfort level  2/25/2025 1323 by Ana María Jean RN  Outcome: Completed  2/25/2025 0803 by Ana María Jean RN  Outcome: Progressing     Problem: Safety - Adult  Goal: Free from fall injury  2/25/2025 1323 by Ana María Jean RN  Outcome: Completed  2/25/2025 0803 by Ana María Jean RN  Outcome: Progressing      ongoing assessment.  Appears grossly intact.

## 2025-03-24 NOTE — DIETITIAN NUTRITION RISK NOTIFICATION - BUCCAL DEPLETION IS
Left vm message for pt to complete IFOB and mail back or drop off to office within 2 weeks per their clinician.  In an effort to ensure that our patients [employer], a Team Member has reviewed your chart and identified an opportunity to provide the best care possible. An attempt was made to discuss or schedule due or overdue Preventive or Chronic Condition care.Care Gaps identified: IFOB   moderate